# Patient Record
Sex: MALE | Race: WHITE | Employment: OTHER | URBAN - METROPOLITAN AREA
[De-identification: names, ages, dates, MRNs, and addresses within clinical notes are randomized per-mention and may not be internally consistent; named-entity substitution may affect disease eponyms.]

---

## 2017-01-05 ENCOUNTER — HOSPITAL ENCOUNTER (OUTPATIENT)
Dept: GENERAL RADIOLOGY | Age: 62
Discharge: HOME | End: 2017-01-05
Attending: INTERNAL MEDICINE | Admitting: INTERNAL MEDICINE

## 2017-01-05 LAB
ANION GAP SERPL CALCULATED.3IONS-SCNC: 11 MMOL/L (ref 4–16)
CHLORIDE BLD-SCNC: 91 MMOL/L (ref 99–110)
CO2: 30 MMOL/L (ref 21–32)
POTASSIUM SERPL-SCNC: 3.1 MMOL/L (ref 3.5–5.1)
SODIUM BLD-SCNC: 132 MMOL/L (ref 135–145)

## 2017-03-20 ENCOUNTER — TELEPHONE (OUTPATIENT)
Dept: INTERNAL MEDICINE CLINIC | Age: 62
End: 2017-03-20

## 2017-03-21 ENCOUNTER — OFFICE VISIT (OUTPATIENT)
Dept: INTERNAL MEDICINE CLINIC | Age: 62
End: 2017-03-21

## 2017-03-21 VITALS
SYSTOLIC BLOOD PRESSURE: 102 MMHG | HEART RATE: 100 BPM | BODY MASS INDEX: 26.03 KG/M2 | HEIGHT: 72 IN | DIASTOLIC BLOOD PRESSURE: 70 MMHG | WEIGHT: 192.2 LBS

## 2017-03-21 DIAGNOSIS — Z76.89 ENCOUNTER TO ESTABLISH CARE WITH NEW DOCTOR: ICD-10-CM

## 2017-03-21 DIAGNOSIS — K70.30 ALCOHOLIC CIRRHOSIS OF LIVER WITHOUT ASCITES (HCC): Primary | ICD-10-CM

## 2017-03-21 DIAGNOSIS — Z00.00 ANNUAL PHYSICAL EXAM: ICD-10-CM

## 2017-03-21 DIAGNOSIS — G25.81 RESTLESS LEG SYNDROME: ICD-10-CM

## 2017-03-21 DIAGNOSIS — F17.200 TOBACCO DEPENDENCE: ICD-10-CM

## 2017-03-21 DIAGNOSIS — Z11.4 SCREENING FOR HIV (HUMAN IMMUNODEFICIENCY VIRUS): ICD-10-CM

## 2017-03-21 DIAGNOSIS — F10.10 ETOH ABUSE: ICD-10-CM

## 2017-03-21 PROCEDURE — 99204 OFFICE O/P NEW MOD 45 MIN: CPT | Performed by: INTERNAL MEDICINE

## 2017-03-21 RX ORDER — GABAPENTIN 100 MG/1
100 CAPSULE ORAL NIGHTLY
Qty: 90 CAPSULE | Refills: 0 | Status: SHIPPED | OUTPATIENT
Start: 2017-03-21 | End: 2017-05-10

## 2017-03-21 ASSESSMENT — ENCOUNTER SYMPTOMS
SORE THROAT: 0
SHORTNESS OF BREATH: 0
NAUSEA: 0
EYE PAIN: 0
WHEEZING: 0
CONSTIPATION: 0
DIARRHEA: 0
BACK PAIN: 0
ABDOMINAL PAIN: 0
VOMITING: 0
COUGH: 0

## 2017-05-03 ENCOUNTER — HOSPITAL ENCOUNTER (OUTPATIENT)
Dept: GENERAL RADIOLOGY | Age: 62
Discharge: HOME | End: 2017-05-03
Attending: INTERNAL MEDICINE | Admitting: INTERNAL MEDICINE

## 2017-05-03 LAB
ANION GAP SERPL CALCULATED.3IONS-SCNC: 16 MMOL/L (ref 4–16)
CHLORIDE BLD-SCNC: 90 MMOL/L (ref 99–110)
CO2: 28 MMOL/L (ref 21–32)
HCT VFR BLD CALC: 46.8 % (ref 42–52)
HEMOGLOBIN: 16.1 GM/DL (ref 13.5–18)
MCH RBC QN AUTO: 34.8 PG (ref 27–31)
MCHC RBC AUTO-ENTMCNC: 34.4 % (ref 32–36)
MCV RBC AUTO: 101.3 FL (ref 78–100)
PDW BLD-RTO: 13.5 % (ref 11.7–14.9)
PLATELET # BLD: 148 K/CU MM (ref 140–440)
PMV BLD AUTO: 11.2 FL (ref 7.5–11.1)
POTASSIUM SERPL-SCNC: 3 MMOL/L (ref 3.5–5.1)
RBC # BLD: 4.62 M/CU MM (ref 4.6–6.2)
SODIUM BLD-SCNC: 134 MMOL/L (ref 135–145)
WBC # BLD: 5.2 K/CU MM (ref 4–10.5)

## 2017-05-09 ENCOUNTER — TELEPHONE (OUTPATIENT)
Dept: INTERNAL MEDICINE CLINIC | Age: 62
End: 2017-05-09

## 2017-05-09 ENCOUNTER — HOSPITAL ENCOUNTER (OUTPATIENT)
Dept: GENERAL RADIOLOGY | Age: 62
Discharge: HOME | End: 2017-05-09
Attending: INTERNAL MEDICINE | Admitting: INTERNAL MEDICINE

## 2017-05-09 LAB
ALBUMIN SERPL-MCNC: 3.6 GM/DL (ref 3.4–5)
ALP BLD-CCNC: 141 IU/L (ref 40–129)
ALT SERPL-CCNC: 20 U/L (ref 10–40)
ANION GAP SERPL CALCULATED.3IONS-SCNC: 11 MMOL/L (ref 4–16)
AST SERPL-CCNC: 46 IU/L (ref 15–37)
BASOPHILS ABSOLUTE: 0 K/CU MM
BASOPHILS RELATIVE PERCENT: 0.7 % (ref 0–1)
BILIRUB SERPL-MCNC: 0.9 MG/DL (ref 0–1)
BUN BLDV-MCNC: 6 MG/DL (ref 6–23)
CALCIUM SERPL-MCNC: 8.8 MG/DL (ref 8.3–10.6)
CHLORIDE BLD-SCNC: 102 MMOL/L (ref 99–110)
CO2: 27 MMOL/L (ref 21–32)
CREAT SERPL-MCNC: 0.8 MG/DL (ref 0.9–1.3)
DIFFERENTIAL TYPE: ABNORMAL
EOSINOPHILS ABSOLUTE: 0.3 K/CU MM
EOSINOPHILS RELATIVE PERCENT: 6.1 % (ref 0–3)
FOLATE: 9.7 NG/ML (ref 3.1–17.5)
GFR AFRICAN AMERICAN: >60 ML/MIN/1.73M2
GFR NON-AFRICAN AMERICAN: >60 ML/MIN/1.73M2
GLUCOSE BLD-MCNC: 106 MG/DL (ref 70–140)
HCT VFR BLD CALC: 42.7 % (ref 42–52)
HEMOGLOBIN: 14.9 GM/DL (ref 13.5–18)
HIV SCREEN: NON REACTIVE
IMMATURE NEUTROPHIL %: 0.2 % (ref 0–0.43)
LYMPHOCYTES ABSOLUTE: 2 K/CU MM
LYMPHOCYTES RELATIVE PERCENT: 45.5 % (ref 24–44)
MCH RBC QN AUTO: 36.1 PG (ref 27–31)
MCHC RBC AUTO-ENTMCNC: 34.9 % (ref 32–36)
MCV RBC AUTO: 103.4 FL (ref 78–100)
MONOCYTES ABSOLUTE: 0.6 K/CU MM
MONOCYTES RELATIVE PERCENT: 13.3 % (ref 0–4)
NUCLEATED RBC %: 0 %
PDW BLD-RTO: 13.7 % (ref 11.7–14.9)
PLATELET # BLD: 143 K/CU MM (ref 140–440)
PMV BLD AUTO: 10.9 FL (ref 7.5–11.1)
POTASSIUM SERPL-SCNC: 4.1 MMOL/L (ref 3.5–5.1)
RBC # BLD: 4.13 M/CU MM (ref 4.6–6.2)
SEGMENTED NEUTROPHILS ABSOLUTE COUNT: 1.5 K/CU MM
SEGMENTED NEUTROPHILS RELATIVE PERCENT: 34.2 % (ref 36–66)
SODIUM BLD-SCNC: 140 MMOL/L (ref 135–145)
TOTAL IMMATURE NEUTOROPHIL: 0.01 K/CU MM
TOTAL NUCLEATED RBC: 0 K/CU MM
TOTAL PROTEIN: 7.1 GM/DL (ref 6.4–8.2)
VITAMIN B-12: 664 PG/ML (ref 211–911)
WBC # BLD: 4.4 K/CU MM (ref 4–10.5)

## 2017-05-10 ENCOUNTER — OFFICE VISIT (OUTPATIENT)
Dept: INTERNAL MEDICINE CLINIC | Age: 62
End: 2017-05-10

## 2017-05-10 VITALS
HEART RATE: 97 BPM | DIASTOLIC BLOOD PRESSURE: 78 MMHG | WEIGHT: 194.4 LBS | SYSTOLIC BLOOD PRESSURE: 100 MMHG | BODY MASS INDEX: 26.37 KG/M2

## 2017-05-10 DIAGNOSIS — F17.200 TOBACCO DEPENDENCE: ICD-10-CM

## 2017-05-10 DIAGNOSIS — K70.30 ALCOHOLIC CIRRHOSIS OF LIVER WITHOUT ASCITES (HCC): Primary | ICD-10-CM

## 2017-05-10 DIAGNOSIS — Z12.11 COLON CANCER SCREENING: ICD-10-CM

## 2017-05-10 DIAGNOSIS — F10.10 ETOH ABUSE: ICD-10-CM

## 2017-05-10 PROCEDURE — 99213 OFFICE O/P EST LOW 20 MIN: CPT | Performed by: INTERNAL MEDICINE

## 2017-05-10 ASSESSMENT — ENCOUNTER SYMPTOMS
DIARRHEA: 0
BACK PAIN: 0
CONSTIPATION: 0
EYE PAIN: 0
WHEEZING: 0
ABDOMINAL PAIN: 0
SORE THROAT: 0
COUGH: 0
SHORTNESS OF BREATH: 0

## 2017-05-12 DIAGNOSIS — E51.9 THIAMINE DEFICIENCY: Primary | ICD-10-CM

## 2017-05-12 LAB — VITAMIN B1, PLASMA: <2

## 2017-05-12 RX ORDER — LANOLIN ALCOHOL/MO/W.PET/CERES
100 CREAM (GRAM) TOPICAL DAILY
Qty: 30 TABLET | Refills: 3 | Status: SHIPPED | OUTPATIENT
Start: 2017-05-12 | End: 2019-02-23

## 2017-05-12 RX ORDER — MULTIVIT-MIN/IRON FUM/FOLIC AC 7.5 MG-4
1 TABLET ORAL DAILY
Qty: 30 TABLET | Refills: 3 | Status: SHIPPED | OUTPATIENT
Start: 2017-05-12 | End: 2017-11-16

## 2017-06-08 ENCOUNTER — OFFICE VISIT (OUTPATIENT)
Dept: INTERNAL MEDICINE CLINIC | Age: 62
End: 2017-06-08

## 2017-06-08 VITALS
SYSTOLIC BLOOD PRESSURE: 102 MMHG | WEIGHT: 196 LBS | DIASTOLIC BLOOD PRESSURE: 66 MMHG | RESPIRATION RATE: 16 BRPM | HEART RATE: 91 BPM | BODY MASS INDEX: 26.58 KG/M2

## 2017-06-08 DIAGNOSIS — F10.10 ETOH ABUSE: ICD-10-CM

## 2017-06-08 DIAGNOSIS — K70.30 ALCOHOLIC CIRRHOSIS OF LIVER WITHOUT ASCITES (HCC): ICD-10-CM

## 2017-06-08 DIAGNOSIS — M25.511 ACUTE PAIN OF RIGHT SHOULDER: Primary | ICD-10-CM

## 2017-06-08 DIAGNOSIS — F17.200 TOBACCO DEPENDENCE: ICD-10-CM

## 2017-06-08 PROCEDURE — 99213 OFFICE O/P EST LOW 20 MIN: CPT | Performed by: INTERNAL MEDICINE

## 2017-06-08 ASSESSMENT — ENCOUNTER SYMPTOMS
COUGH: 0
SHORTNESS OF BREATH: 0
BACK PAIN: 0
EYE PAIN: 0
DIARRHEA: 0
CONSTIPATION: 0
WHEEZING: 0
ABDOMINAL PAIN: 0
SORE THROAT: 0

## 2017-08-08 ENCOUNTER — HOSPITAL ENCOUNTER (OUTPATIENT)
Dept: GENERAL RADIOLOGY | Age: 62
Discharge: HOME | End: 2017-08-08
Attending: INTERNAL MEDICINE | Admitting: INTERNAL MEDICINE

## 2017-08-08 LAB
ALBUMIN SERPL-MCNC: 3.6 GM/DL (ref 3.4–5)
ALP BLD-CCNC: 136 IU/L (ref 40–129)
ALT SERPL-CCNC: 24 U/L (ref 10–40)
ANION GAP SERPL CALCULATED.3IONS-SCNC: 13 MMOL/L (ref 4–16)
AST SERPL-CCNC: 55 IU/L (ref 15–37)
BILIRUB SERPL-MCNC: 1.5 MG/DL (ref 0–1)
BILIRUBIN DIRECT: 0.5 MG/DL (ref 0–0.3)
BILIRUBIN, INDIRECT: 1 MG/DL (ref 0–0.7)
CHLORIDE BLD-SCNC: 101 MMOL/L (ref 99–110)
CO2: 25 MMOL/L (ref 21–32)
POTASSIUM SERPL-SCNC: 4.1 MMOL/L (ref 3.5–5.1)
SODIUM BLD-SCNC: 139 MMOL/L (ref 135–145)
TOTAL PROTEIN: 7.4 GM/DL (ref 6.4–8.2)

## 2017-09-12 ENCOUNTER — TELEPHONE (OUTPATIENT)
Dept: INTERNAL MEDICINE CLINIC | Age: 62
End: 2017-09-12

## 2017-09-12 DIAGNOSIS — G89.29 CHRONIC RIGHT SHOULDER PAIN: Primary | ICD-10-CM

## 2017-09-12 DIAGNOSIS — M25.511 CHRONIC RIGHT SHOULDER PAIN: Primary | ICD-10-CM

## 2017-09-18 ENCOUNTER — OFFICE VISIT (OUTPATIENT)
Dept: ORTHOPEDIC SURGERY | Age: 62
End: 2017-09-18

## 2017-09-18 VITALS — WEIGHT: 196 LBS | HEIGHT: 72 IN | BODY MASS INDEX: 26.55 KG/M2 | RESPIRATION RATE: 16 BRPM

## 2017-09-18 DIAGNOSIS — M75.21 BICEPS TENDINITIS ON RIGHT: Primary | ICD-10-CM

## 2017-09-18 DIAGNOSIS — R52 PAIN: ICD-10-CM

## 2017-09-18 PROCEDURE — 73010 X-RAY EXAM OF SHOULDER BLADE: CPT | Performed by: ORTHOPAEDIC SURGERY

## 2017-09-18 PROCEDURE — 73030 X-RAY EXAM OF SHOULDER: CPT | Performed by: ORTHOPAEDIC SURGERY

## 2017-09-18 PROCEDURE — 20610 DRAIN/INJ JOINT/BURSA W/O US: CPT | Performed by: ORTHOPAEDIC SURGERY

## 2017-09-18 PROCEDURE — 99244 OFF/OP CNSLTJ NEW/EST MOD 40: CPT | Performed by: ORTHOPAEDIC SURGERY

## 2017-09-18 ASSESSMENT — ENCOUNTER SYMPTOMS
EYES NEGATIVE: 1
GASTROINTESTINAL NEGATIVE: 1
RESPIRATORY NEGATIVE: 1

## 2017-09-25 ENCOUNTER — HOSPITAL ENCOUNTER (OUTPATIENT)
Dept: PHYSICAL THERAPY | Age: 62
Discharge: HOME | End: 2017-09-30
Attending: ORTHOPAEDIC SURGERY | Admitting: ORTHOPAEDIC SURGERY

## 2017-09-25 ASSESSMENT — PAIN DESCRIPTION - PROGRESSION: CLINICAL_PROGRESSION: GRADUALLY IMPROVING

## 2017-09-25 ASSESSMENT — PAIN DESCRIPTION - ORIENTATION: ORIENTATION: RIGHT

## 2017-09-25 ASSESSMENT — PAIN DESCRIPTION - DESCRIPTORS: DESCRIPTORS: ACHING;SHARP

## 2017-09-25 ASSESSMENT — PAIN DESCRIPTION - LOCATION: LOCATION: SHOULDER;ARM

## 2017-09-25 ASSESSMENT — PAIN SCALES - GENERAL: PAINLEVEL_OUTOF10: 0

## 2017-09-25 ASSESSMENT — PAIN DESCRIPTION - PAIN TYPE: TYPE: ACUTE PAIN

## 2017-09-25 ASSESSMENT — PAIN DESCRIPTION - FREQUENCY: FREQUENCY: INTERMITTENT

## 2017-09-25 ASSESSMENT — PAIN DESCRIPTION - ONSET: ONSET: SUDDEN

## 2017-09-25 NOTE — FLOWSHEET NOTE
Exercise  [] Modalities:  [x] Therapeutic Activity     [] Ultrasound  [] Elec  Stim  [] Gait Training      [] Cervical Traction [] Lumbar Traction  [x] Neuromuscular Re-education    [] Cold/hotpack [] Iontophoresis   [x] Instruction in HEP      [] Vasopneumatic     [x] Manual Therapy               [] Aquatic Therapy     Manual Treatments:  None    Modalities:  None    Communication with other providers:  POC set for cosign 9/25/17    Education provided to patient/caregiver: Adverse reactions to treatment:      Equipment provided:      Assessment:  Pt is a 58 yo male who presents w/ R shoulder pain resolving after Cortizone injection. He demonstrates mild hypomoblity in R shoulder w/ postural deficits and some high riding of humeral head in [address] joint w/ crepitus noted. He will benefit from a couple sessions of PT to educate for a home management program to help continue to manage his pain. Prior to June of this year he had no pain in R shoudler. Patient agrees with established plan of care and assisted in the development of their short term and long term goals. Patient had no adverse reaction with initial treatment and there are no barriers to learning. Demonstrates no mental or cognitive disorder.      Time In / Time Out:  0925/1000                 Timed Code/Total Treatment Minutes:  15/35    Patients Report of Tolerance:    [] Patient limited by fatigue        [] Patient limited by pain   [] Patient limited by other medical complications   [x] Other: tolerated w/o pain    Prognosis:   [x] Good [] Fair  [] Poor    Plan:   [] Continue per plan of care [] Alter current plan (see comments)  [x] Plan of care initiated [] Hold pending MD visit [] Discharge    Plan for Next Session:  Review HEP and recheck, D/C as approp, discuss postural input        Next Progress Note due:    At d/c        Electronically signed by:  Marley Baltazar, PT, MPT, ATC  9/25/2017, 10:13 AM    9/25/2017, 10:13 AM

## 2017-09-25 NOTE — PROGRESS NOTES
Physical Therapy  Initial Assessment  Date: 2017  Patient Name: Aman Londono  MRN: [id number]  : 1955     Treatment Diagnosis: R shoulder pain, postural deficits    Restrictions  Position Activity Restriction  Other position/activity restrictions: none    Subjective   General  Chart Reviewed: Yes  Patient assessed for rehabilitation services?: Yes  Additional Pertinent Hx: pain since , working under a truck in awkward position and not sure what did but started hurting after this, did Get Cortizone and that seems to be helping, no prior Hx of issue and good on L side  Referring Practitioner: Horacio Mckay  Diagnosis: R bicep tendonitis  General Comment  Comments: R handed  Subjective  Subjective: pain currently not too bad, but pain almost gone and sleep much better only occasional twinges now  Pain Screening  Patient Currently in Pain: Yes  Pain Assessment  Pain Assessment: 0-10  Pain Level: 0 (max pain since cortizone 1-2/10, before Cortizone 7-/8/10 mary at night)  Pain Type: Acute pain  Pain Location: Shoulder;Arm  Pain Orientation: Right  Pain Radiating Towards: to elbow ant and post  Pain Descriptors: Aching; Sharp  Pain Frequency: Intermittent  Pain Onset: Sudden  Clinical Progression: Gradually improving  Effect of Pain on Daily Activities: limits work just a little now, was more limiting and sleep too  Patient's Stated Pain Goal: No pain  Vital Signs  Patient Currently in Pain: Yes    Vision/Hearing  Vision  Vision: Impaired    Orientation  Orientation  Overall Orientation Status: Within Normal Limits    Social/Functional History  Social/Functional History  Lives With: Spouse  Type of Home: House  Occupation: Full time employment  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     Leisure & Hobbies: housework and yard work   Objective     Observation/Palpation  Palpation: TTP ant and post shoulder just mild today    AROM RUE (degrees)  RUE General AROM: flex and abd WFL min end range catch/pain, ER and IR scratch also more noted min difference w/ some catch and end range pain    Joint Mobility  ROM RUE: mild hypomobility R shoulder w/ rounded posture and protracted scapula    Strength RUE  Strength RUE: WNL  Comment: no pain, including prone scap  Strength LUE  Strength LUE: WNL     Additional Measures  Special Tests: Negative impinigement and bicep tests, mild decreased mobility w/ tests and some crepitus  Other: No issue neck or elbow  Sensation  Overall Sensation Status: WNL    Assessment   Conditions Requiring Skilled Therapeutic Intervention  Body structures, Functions, Activity limitations: Decreased functional mobility ; Decreased ADL status; Decreased ROM  Assessment: Pt is a 58 yo male who presents w/ R shoulder pain resolving after Cortizone injection. He demonstrates mild hypomoblity in R shoulder w/ postural deficits and some high riding of humeral head in [facility] joint w/ crepitus noted. He will benefit from a couple sessions of PT to educate for a home management program to help continue to manage his pain. Prior to June of this year he had no pain in R shoudler. Patient agrees with established plan of care and assisted in the development of their short term and long term goals. Patient had no adverse reaction with initial treatment and there are no barriers to learning. Demonstrates no mental or cognitive disorder.    Treatment Diagnosis: R shoulder pain, postural deficits  Prognosis: Excellent  Decision Making: Low Complexity  Patient Education: see flow sheet  Barriers to Learning: none  REQUIRES PT FOLLOW UP: Yes         Plan   Plan  Times per week: 1x every other week  Plan weeks: 2 weeks  Specific instructions for Next Treatment: check HEP and d/c as approp, review postural input  Current Treatment Recommendations: Strengthening, ROM, Home Exercise Program, Functional Mobility Training    G-Code  PT G-Codes  Functional Assessment Tool Used: quick dash  Score: 21  Functional Limitation: Carrying, moving and handling objects  Carrying, Moving and Handling Objects Current Status (): At least 20 percent but less than 40 percent impaired, limited or restricted  Carrying, Moving and Handling Objects Goal Status ():  At least 1 percent but less than 20 percent impaired, limited or restricted    OutComes Score  Quick Dash 21  Goals  Short term goals  Time Frame for Short term goals: defer to LTG's  Long term goals  Time Frame for Long term goals : 2 weeks, 10/9/17  Long term goal 1: Pt will be independent w/ HEP and able to manage his pain on his own  Long term goal 2: Pt will be able to work and perform all usual activity w/o pain  Patient Goals   Patient goals : get back to normal and work w/o pain         Shanta Woodall, PT  PT, MPT, ATC     9/25/2017, 10:11 AM

## 2017-09-25 NOTE — PLAN OF CARE
Outpatient Physical Therapy           [place]           [x] Phone: [phone]   Fax: [phone]  [place]           [] Phone: [phone]   Fax: [phone]     To: Referring Practitioner: Jabari Gutierrez    From: Edith Oliveira, KRISTAN     Patient: Glenora Goodell       : 1955  Diagnosis: Diagnosis: R bicep tendonitis   Treatment Diagnosis: Treatment Diagnosis: R shoulder pain, postural deficits   Date: 2017    Physical Therapy Certification/Re-Certification Form  Dear Dr. Jabari Gutierrez,   The following patient has been evaluated for physical therapy services and for therapy to continue, insurance requires physician review of the treatment plan initially and every 90 days. Please review the attached evaluation and/or summary of the patient's plan of care, and verify that you agree therapy should continue by signing the attached document and sending it back to our office. Assessment:  Pt is a 58 yo male who presents w/ R shoulder pain resolving after Cortizone injection. He demonstrates mild hypomoblity in R shoulder w/ postural deficits and some high riding of humeral head in [address] joint w/ crepitus noted. He will benefit from a couple sessions of PT to educate for a home management program to help continue to manage his pain. Prior to  of this year he had no pain in R shoudler. Patient agrees with established plan of care and assisted in the development of their short term and long term goals. Patient had no adverse reaction with initial treatment and there are no barriers to learning. Demonstrates no mental or cognitive disorder.      Plan of Care/Treatment to date:  [x] Therapeutic Exercise  [x] Modalities:  [x] Therapeutic Activity     [] Ultrasound  [] Electrical Stimulation  [] Gait Training      [] Cervical Traction [] Lumbar Traction  [x] Neuromuscular Re-education    [x] Cold/hotpack [] Iontophoresis   [x] Instruction in HEP      [] Vasopneumatic     [x] Manual Therapy               [] Aquatic Therapy       Other:    ? Frequency/Duration:  # Days per week: [x] 1 day # Weeks: [] 1 week [] 5 weeks     [] 2 days?    [x] 2 weeks [] 6 weeks     [] 3 days   [] 3 weeks [] 7 weeks     [] 4 days   [] 4 weeks [] 8 weeks         [] 9 weeks [] 10 weeks         [] 11 weeks [] 12 weeks    Rehab Potential/Progress: [x] Excellent [] Good [] Fair  [] Poor     Goals:      Short term goals  Time Frame for Short term goals: defer to LTG's  Long term goals  Time Frame for Long term goals : 2 weeks, 10/9/17  Long term goal 1: Pt will be independent w/ HEP and able to manage his pain on his own  Long term goal 2: Pt will be able to work and perform all usual activity w/o pain    G-Code Selection: (On Eval and every 10th visit or Discharge)  MEASURE  [] Mobility: Walking and Moving Around     [] Current ()   [] Goal ()   [] DC ()  [] Changing/Maintaining Body Position     [] Current (8981)      [] Goal ()   [] DC ()  [x] Carrying / Moving / Handling Objects     [x] Current ()   [x] Goal ()   [] DC ()  [] Self-Care     [] Current ()   [] Goal ()   [] DC ()  [] Other PT/OT primary DX     [] Current ()   [] Goal ()   [] DC ()    SEVERITY  CURRENT  GOAL  DISCHARGE   [] CH (0% Impaired, Indep.)  [] CI (1-19% Impaired, SBA-CGA)  [x] CJ (20-39% Impaired, MIN A)  [] CK  (40-59% Impairment, Mod A)  [] CL  (60-79% Impairment, Max A)  [] CM  (80-99% Impairment, Dep.)   [] CN  (100% Impairment, Tot Dep.) [] CH (0% Impaired, Indep.)  [x] CI (1-19% Impaired, SBA-CGA)  [] CJ (20-39% Impaired, MIN A)  [] CK  (40-59% Impairment, Mod A)  [] CL  (60-79% Impairment, Max A)  [] CM  (80-99% Impairment, Dep.)   [] CN  (100% Impairment, Tot Dep.)  [] CH (0% Impaired, Indep.)  [] CI (1-19% Impaired, SBA-CGA)  [] CJ (20-39% Impaired, MIN A)  [] CK  (40-59% Impairment, Mod A)  [] CL  (60-79% Impairment, Max A)  [] CM  (80-99% Impairment, Dep.)   [] CN  (100% Impairment, Tot Dep.) Electronically signed by:  Jerardo Reyes PT, MPT, ATC  9/25/2017, 10:11 AM    9/25/2017, 10:12 AM  If you have any questions or concerns, please don't hesitate to call.   Thank you for your referral.      Physician Signature:________________________________Date:_________ TIME: _____  By signing above, therapists plan is approved by physician

## 2017-10-01 ENCOUNTER — HOSPITAL ENCOUNTER (OUTPATIENT)
Dept: OTHER | Age: 62
Discharge: HOME | End: 2017-10-10
Attending: ORTHOPAEDIC SURGERY | Admitting: ORTHOPAEDIC SURGERY

## 2017-10-10 ENCOUNTER — HOSPITAL ENCOUNTER (OUTPATIENT)
Dept: PHYSICAL THERAPY | Age: 62
Discharge: HOME | End: 2017-10-10
Admitting: ORTHOPAEDIC SURGERY

## 2017-10-10 NOTE — PROGRESS NOTES
Outpatient Physical Therapy           [place]           [x] Phone: [phone]   Fax: [phone]  [place]           [] Phone: [phone]   Fax: [phone]      To: Praful Richardson        From: Henry Beck, KRISTAN     Patient: Edgar Pérez                  : 1955  Diagnosis: R bicep tendonitis    Date: 10/10/2017  Treatment Diagnosis: R shoulder pain, postural deficits      []  Progress Note                [x]  Discharge Note    Evaluation Date:  17 Total Visits to date:  2 Cancels/No-shows to date:  0    Subjective:  Some soreness w/ work is all, no real pain. HEP was fine. Working on building strength back up. Plan of Care/Treatment to date:  [x] Therapeutic Exercise    [] Modalities:  [x] Therapeutic Activity     [] Ultrasound  [] Electrical Stimulation  [] Gait Training      [] Cervical Traction   [] Lumbar Traction  [x] Neuromuscular Re-education  [] Cold/hotpack [] Iontophoresis  [x] Instruction in HEP      Other:  [x] Manual Therapy       []  Vasopneumatic  [] Aquatic Therapy       []                    ? Objective/Significant Findings At Last Visit/Comments:  Still moderate crepitus w/ AROM all directions in R shoulder w/ mild discomfort but mostly equal motion to L UE. Assessment:   Pt tolerated Rx well and is doing well w/ his HEP. He should do ok on his own w/ the HEP at this point, but knows to follow up w/ doctor prn. No pain after Rx and some decreased crepitus following Rx.        Goal Status:  [x] Achieved [] Partially Achieved  [] Not Achieved   Short term goals  Time Frame for Short term goals: defer to LTG's  Long term goals  Time Frame for Long term goals : 2 weeks, 10/9/17  Long term goal 1: Pt will be independent w/ HEP and able to manage his pain on his own  Met  Long term goal 2: Pt will be able to work and perform all usual activity w/o pain  Met     G-Code Selection: (On Eval and every 10th visit or Discharge)  MEASURE  [] Mobility: Walking and Moving Around     [] Current ()   [] Goal ()   [] DC ()  [] Changing/Maintaining Body Position     [] Current (7686)      [] Goal ()   [] DC ()  [x] Carrying / Moving / Handling Objects     [] Current ()   [x] Goal ()   [x] DC ()  [] Self-Care     [] Current ()   [] Goal ()   [] DC ()  [] Other PT/OT primary DX     [] Current ()   [] Goal ()   [] DC ()    SEVERITY  CURRENT  GOAL  DISCHARGE   [] CH (0% Impaired, Indep.)  [] CI (1-19% Impaired, SBA-CGA)  [] CJ (20-39% Impaired, MIN A)  [] CK  (40-59% Impairment, Mod A)  [] CL  (60-79% Impairment, Max A)  [] CM  (80-99% Impairment, Dep.)   [] CN  (100% Impairment, Tot Dep.) [] CH (0% Impaired, Indep.)  [x] CI (1-19% Impaired, SBA-CGA)  [] CJ (20-39% Impaired, MIN A)  [] CK  (40-59% Impairment, Mod A)  [] CL  (60-79% Impairment, Max A)  [] CM  (80-99% Impairment, Dep.)   [] CN  (100% Impairment, Tot Dep.)  [] CH (0% Impaired, Indep.)  [x] CI (1-19% Impaired, SBA-CGA)  [] CJ (20-39% Impaired, MIN A)  [] CK  (40-59% Impairment, Mod A)  [] CL  (60-79% Impairment, Max A)  [] CM  (80-99% Impairment, Dep.)   [] CN  (100% Impairment, Tot Dep.)         Frequency/Duration:  # Days per week: [x] 1 day # Weeks: [] 1 week [] 4 weeks [] 8 weeks     [] 2 days? [x] 2 weeks [] 5 weeks [] 10 weeks     [] 3 days   [] 3 weeks [] 6 weeks [] 12 weeks       Rehab Potential: [x] Excellent [] Good [] Fair  [] Poor         Patient Status: [] Continue per initial plan of Care     [x] Patient now discharged     [] Additional visits requested, Please re-certify for additional visits:          Electronically signed by:  STACY Potter, ATC , 10/10/2017, 6:56 AM    10/10/2017, 8:58 AM   If you have any questions or concerns, please don't hesitate to call.   Thank you for your referral.

## 2017-10-10 NOTE — FLOWSHEET NOTE
Outpatient Physical Therapy           [place]           [x] Phone: [phone]   Fax: [phone]  [place]           [] Phone: [phone]   Fax: [phone]    Physical Therapy Daily Treatment Note  Date:  10/10/2017    Patient Name:  Rene Denson    :  1955  MRN: [id number]  Restrictions/Precautions:   Diagnosis:   Diagnosis: R bicep tendonitis  Treatment Diagnosis: Treatment Diagnosis: R shoulder pain, postural deficits    Insurance/Certification information:  Maria D [employer]  Referring Physician:  Referring Practitioner: Dania Medrano  Next Doctor Visit:    Plan of care signed (Y/N):  yes  Visit# / total visits:   2/2 POC  Pain level: 0/10   Goals:       Short term goals  Time Frame for Short term goals: defer to LTG's  Long term goals  Time Frame for Long term goals : 2 weeks, 10/9/17  Long term goal 1: Pt will be independent w/ HEP and able to manage his pain on his own  Met  Long term goal 2: Pt will be able to work and perform all usual activity w/o pain  Met          Subjective:   Some soreness w/ work is all, no real pain. HEP was fine. Working on building strength back up. Any changes in Ambulatory Summary Sheet? No        Objective:   Still moderate crepitus w/ AROM all directions in R shoulder w/ mild discomfort but mostly equal motion to L UE.           Exercises:  Exercise/Equipment 9/25/17 10/10/17 Date           pec stretch   instruct 30\"x2    tricep stretch   20\" 30\"x2    Posterior shoulder stretch   20\" 30\"x2            Taffy pulls   BTB 10x BTB 20x    Lawnmower pulls BTB 10x BTB 20x    Flex TB   BTB-Lots of crepitus, held --    Counter push up   10x2 20x                                                                                         Other Therapeutic Activities/Education:  Reviewed posture and role in function of scap    Home Exercise Program:  Continue for a few more weeks as able, added eccentric shoulder flex    Modality/intervention used:    [x] Therapeutic Exercise  [] Modalities:  [x] Therapeutic Activity     [] Ultrasound  [] Elec  Stim  [] Gait Training      [] Cervical Traction [] Lumbar Traction  [x] Neuromuscular Re-education    [] Cold/hotpack [] Iontophoresis   [x] Instruction in HEP      [] Vasopneumatic     [x] Manual Therapy               [] Aquatic Therapy     Manual Treatments:  scap mobs and [address] mobs to try and reduce crepitus. some AC and clavicle too, 10'    Modalities:  None    Communication with other providers:  POC set for cosign 9/25/17    Education provided to patient/caregiver: Adverse reactions to treatment:      Equipment provided:      Assessment:  Pt tolerated Rx well and is doing well w/ his HEP. He should do ok on his own w/ the HEP at this point, but knows to follow up w/ doctor prn. No pain after Rx and some decreased crepitus following Rx.      Time In / Time Out:    0820/0855               Timed Code/Total Treatment Minutes:  35/35     Patients Report of Tolerance:    [] Patient limited by fatigue        [] Patient limited by pain   [] Patient limited by other medical complications   [x] Other: tolerated w/o pain    Prognosis:   [x] Good [] Fair  [] Poor    Plan:   [] Continue per plan of care [] Alter current plan (see comments)  [] Plan of care initiated [] Hold pending MD visit [x] Discharge    Plan for Next Session:    D/C      Next Progress Note due:    See note 10/10/17       Electronically signed by:  Santa Christie PT, MPT, ATC  10/10/2017, 6:54 AM     10/10/2017, 8:55 AM

## 2017-11-16 ENCOUNTER — OFFICE VISIT (OUTPATIENT)
Dept: INTERNAL MEDICINE CLINIC | Age: 62
End: 2017-11-16

## 2017-11-16 VITALS
BODY MASS INDEX: 25.63 KG/M2 | HEART RATE: 88 BPM | DIASTOLIC BLOOD PRESSURE: 76 MMHG | RESPIRATION RATE: 16 BRPM | SYSTOLIC BLOOD PRESSURE: 120 MMHG | OXYGEN SATURATION: 98 % | WEIGHT: 189 LBS

## 2017-11-16 DIAGNOSIS — K70.30 ALCOHOLIC CIRRHOSIS OF LIVER WITHOUT ASCITES (HCC): ICD-10-CM

## 2017-11-16 DIAGNOSIS — M75.21 BICEPS TENDINITIS ON RIGHT: ICD-10-CM

## 2017-11-16 DIAGNOSIS — F10.10 ETOH ABUSE: ICD-10-CM

## 2017-11-16 DIAGNOSIS — F17.200 TOBACCO DEPENDENCE: Primary | ICD-10-CM

## 2017-11-16 PROCEDURE — 3017F COLORECTAL CA SCREEN DOC REV: CPT | Performed by: INTERNAL MEDICINE

## 2017-11-16 PROCEDURE — G8484 FLU IMMUNIZE NO ADMIN: HCPCS | Performed by: INTERNAL MEDICINE

## 2017-11-16 PROCEDURE — G8417 CALC BMI ABV UP PARAM F/U: HCPCS | Performed by: INTERNAL MEDICINE

## 2017-11-16 PROCEDURE — G8427 DOCREV CUR MEDS BY ELIG CLIN: HCPCS | Performed by: INTERNAL MEDICINE

## 2017-11-16 PROCEDURE — 99213 OFFICE O/P EST LOW 20 MIN: CPT | Performed by: INTERNAL MEDICINE

## 2017-11-16 PROCEDURE — 4004F PT TOBACCO SCREEN RCVD TLK: CPT | Performed by: INTERNAL MEDICINE

## 2017-11-16 ASSESSMENT — ENCOUNTER SYMPTOMS
WHEEZING: 0
SORE THROAT: 0
DIARRHEA: 0
EYE PAIN: 0
ABDOMINAL PAIN: 0
BACK PAIN: 0
SHORTNESS OF BREATH: 0
CONSTIPATION: 0
COUGH: 0

## 2017-11-16 NOTE — PROGRESS NOTES
times daily        No current facility-administered medications for this visit. Objective:  /76   Pulse 88   Resp 16   Wt 189 lb (85.7 kg)   SpO2 98%   BMI 25.63 kg/m²   BP Readings from Last 3 Encounters:   11/16/17 120/76   06/08/17 102/66   05/10/17 100/78     Wt Readings from Last 3 Encounters:   11/16/17 189 lb (85.7 kg)   09/18/17 196 lb (88.9 kg)   06/08/17 196 lb (88.9 kg)         Physical Exam   Constitutional: He is oriented to person, place, and time. He appears well-developed and well-nourished. No distress. HENT:   Head: Normocephalic and atraumatic. Eyes: Conjunctivae are normal. No scleral icterus. Neck: Normal range of motion. Neck supple. Cardiovascular: Normal rate and regular rhythm. Pulmonary/Chest: Effort normal and breath sounds normal. No respiratory distress. He has no wheezes. Abdominal: Soft. Bowel sounds are normal. He exhibits no distension. There is no tenderness. Neurological: He is alert and oriented to person, place, and time. Psychiatric: He has a normal mood and affect.  Judgment normal.       Lab Results   Component Value Date    WBC 4.4 05/09/2017    HGB 14.9 05/09/2017    HCT 42.7 05/09/2017    .4 (H) 05/09/2017     05/09/2017     Lab Results   Component Value Date     08/08/2017    K 4.1 08/08/2017     08/08/2017    CO2 25 08/08/2017    BUN 6 05/09/2017    CREATININE 0.8 (L) 05/09/2017    GLUCOSE 106 05/09/2017    CALCIUM 8.8 05/09/2017    PROT 7.4 08/08/2017    LABALBU 3.6 08/08/2017    BILITOT 1.5 (H) 08/08/2017    ALKPHOS 136 (H) 08/08/2017    AST 55 (H) 08/08/2017    ALT 24 08/08/2017    LABGLOM >60 05/09/2017    GFRAA >60 05/09/2017     Lab Results   Component Value Date    CHOL 133 01/19/2011     Lab Results   Component Value Date    TRIG 58 01/19/2011     Lab Results   Component Value Date    HDL 35 (L) 01/19/2011     Lab Results   Component Value Date    LDLCALC 86 01/19/2011     Lab Results   Component Value Date    LABA1C 5.5 05/20/2014         ASSESSMENT:      1. Tobacco dependence    2. Alcoholic cirrhosis of liver without ascites ([facility] [address]    3. ETOH abuse    4. Biceps tendinitis on right        PLAN:  1. Continue to encourage patient to quit smoking. Support offered patient is deferring for now. He is also deferring any nicotine replacement products. 2.  Continue to encourage patient to quit alcohol. He says he will keep on trying. 3.  Flu shot offered but patient is deferring. 4.  Pneumonia vaccination offered but patient is deferring. 5.  Says he will discuss colonoscopy with his gastroenterologist.  6.  Generally not interested in any preventive measure. 7.  Medications reviewed and reconciled. Care discussed with patient. Questions answered. Patient verbalizes understanding and agrees with plan. After visit summary provided. Advised to call for any problems, questions, or concerns. Return in about 3 months (around 2/16/2018). This note was partially completed with a verbal recognition program and it was checked for errors. It is possible that there are still dictated errors within this office note. Any errors should be brought immediately to my attention for correction. All efforts were made to ensure that this office note is accurate.        Signed:  Girma Sherman MD  11/16/17  8:43 AM

## 2017-12-22 ENCOUNTER — TELEPHONE (OUTPATIENT)
Dept: INTERNAL MEDICINE CLINIC | Age: 62
End: 2017-12-22

## 2017-12-22 DIAGNOSIS — M75.21 BICEPS TENDINITIS ON RIGHT: Primary | ICD-10-CM

## 2017-12-27 ENCOUNTER — HOSPITAL ENCOUNTER (OUTPATIENT)
Dept: WOMENS IMAGING | Age: 62
Discharge: HOME | End: 2017-12-27
Attending: PLASTIC SURGERY | Admitting: PLASTIC SURGERY

## 2017-12-27 DIAGNOSIS — N63.0 BREAST LUMP: ICD-10-CM

## 2017-12-27 DIAGNOSIS — N63.10 BREAST MASS, RIGHT: ICD-10-CM

## 2018-05-15 ENCOUNTER — HOSPITAL ENCOUNTER (OUTPATIENT)
Dept: GENERAL RADIOLOGY | Age: 63
Discharge: HOME | End: 2018-05-15
Attending: INTERNAL MEDICINE | Admitting: INTERNAL MEDICINE

## 2018-05-15 LAB
ANION GAP SERPL CALCULATED.3IONS-SCNC: 11 MMOL/L (ref 4–16)
BUN BLDV-MCNC: 9 MG/DL (ref 6–23)
CALCIUM SERPL-MCNC: 9 MG/DL (ref 8.3–10.6)
CHLORIDE BLD-SCNC: 103 MMOL/L (ref 99–110)
CO2: 27 MMOL/L (ref 21–32)
CREAT SERPL-MCNC: 0.9 MG/DL (ref 0.9–1.3)
GFR AFRICAN AMERICAN: >60 ML/MIN/1.73M2
GFR NON-AFRICAN AMERICAN: >60 ML/MIN/1.73M2
GLUCOSE BLD-MCNC: 107 MG/DL (ref 70–99)
POTASSIUM SERPL-SCNC: 3.7 MMOL/L (ref 3.5–5.1)
SODIUM BLD-SCNC: 141 MMOL/L (ref 135–145)

## 2018-10-18 ENCOUNTER — HOSPITAL ENCOUNTER (OUTPATIENT)
Age: 63
Discharge: HOME | End: 2018-10-18
Payer: MEDICARE

## 2018-10-18 LAB
ANION GAP SERPL CALCULATED.3IONS-SCNC: 11 MMOL/L (ref 4–16)
BUN BLDV-MCNC: 7 MG/DL (ref 6–23)
CALCIUM SERPL-MCNC: 8.6 MG/DL (ref 8.3–10.6)
CHLORIDE BLD-SCNC: 102 MMOL/L (ref 99–110)
CO2: 26 MMOL/L (ref 21–32)
CREAT SERPL-MCNC: 0.8 MG/DL (ref 0.9–1.3)
GFR AFRICAN AMERICAN: >60 ML/MIN/1.73M2
GFR NON-AFRICAN AMERICAN: >60 ML/MIN/1.73M2
GLUCOSE BLD-MCNC: 123 MG/DL (ref 70–99)
POTASSIUM SERPL-SCNC: 4.2 MMOL/L (ref 3.5–5.1)
SODIUM BLD-SCNC: 139 MMOL/L (ref 135–145)

## 2018-10-18 PROCEDURE — 80048 BASIC METABOLIC PNL TOTAL CA: CPT

## 2018-10-18 PROCEDURE — 36415 COLL VENOUS BLD VENIPUNCTURE: CPT

## 2019-02-23 ENCOUNTER — OFFICE VISIT (OUTPATIENT)
Dept: FAMILY MEDICINE CLINIC | Age: 64
End: 2019-02-23
Payer: MEDICARE

## 2019-02-23 VITALS
DIASTOLIC BLOOD PRESSURE: 80 MMHG | HEIGHT: 72 IN | WEIGHT: 195 LBS | SYSTOLIC BLOOD PRESSURE: 118 MMHG | OXYGEN SATURATION: 96 % | TEMPERATURE: 98.4 F | BODY MASS INDEX: 26.41 KG/M2 | HEART RATE: 93 BPM

## 2019-02-23 DIAGNOSIS — F17.200 TOBACCO DEPENDENCE: ICD-10-CM

## 2019-02-23 DIAGNOSIS — K70.30 ALCOHOLIC CIRRHOSIS, UNSPECIFIED WHETHER ASCITES PRESENT (HCC): Primary | ICD-10-CM

## 2019-02-23 PROCEDURE — 1101F PT FALLS ASSESS-DOCD LE1/YR: CPT | Performed by: NURSE PRACTITIONER

## 2019-02-23 PROCEDURE — 4004F PT TOBACCO SCREEN RCVD TLK: CPT | Performed by: NURSE PRACTITIONER

## 2019-02-23 PROCEDURE — G8419 CALC BMI OUT NRM PARAM NOF/U: HCPCS | Performed by: NURSE PRACTITIONER

## 2019-02-23 PROCEDURE — 4040F PNEUMOC VAC/ADMIN/RCVD: CPT | Performed by: NURSE PRACTITIONER

## 2019-02-23 PROCEDURE — G8484 FLU IMMUNIZE NO ADMIN: HCPCS | Performed by: NURSE PRACTITIONER

## 2019-02-23 PROCEDURE — 99213 OFFICE O/P EST LOW 20 MIN: CPT | Performed by: NURSE PRACTITIONER

## 2019-02-23 PROCEDURE — 1123F ACP DISCUSS/DSCN MKR DOCD: CPT | Performed by: NURSE PRACTITIONER

## 2019-02-23 PROCEDURE — 3017F COLORECTAL CA SCREEN DOC REV: CPT | Performed by: NURSE PRACTITIONER

## 2019-02-23 PROCEDURE — G8427 DOCREV CUR MEDS BY ELIG CLIN: HCPCS | Performed by: NURSE PRACTITIONER

## 2019-02-23 RX ORDER — POTASSIUM CHLORIDE 20 MEQ/1
40 TABLET, EXTENDED RELEASE ORAL 2 TIMES DAILY
Qty: 120 TABLET | Refills: 3 | Status: SHIPPED | OUTPATIENT
Start: 2019-02-23 | End: 2019-05-02 | Stop reason: SINTOL

## 2019-02-23 RX ORDER — POTASSIUM CHLORIDE 20 MEQ/1
40 TABLET, EXTENDED RELEASE ORAL 2 TIMES DAILY
Qty: 120 TABLET | Refills: 2 | Status: SHIPPED
Start: 2019-02-23 | End: 2019-02-23 | Stop reason: SDUPTHER

## 2019-02-23 RX ORDER — SPIRONOLACTONE 50 MG/1
50 TABLET, FILM COATED ORAL DAILY
Qty: 30 TABLET | Refills: 2 | Status: SHIPPED
Start: 2019-02-23 | End: 2019-02-23 | Stop reason: SDUPTHER

## 2019-02-23 RX ORDER — SPIRONOLACTONE 50 MG/1
50 TABLET, FILM COATED ORAL DAILY
Qty: 30 TABLET | Refills: 3 | Status: SHIPPED | OUTPATIENT
Start: 2019-02-23 | End: 2019-06-10 | Stop reason: SDUPTHER

## 2019-02-23 RX ORDER — FUROSEMIDE 40 MG/1
40 TABLET ORAL DAILY
Qty: 30 TABLET | Refills: 3 | Status: SHIPPED | OUTPATIENT
Start: 2019-02-23 | End: 2019-06-10 | Stop reason: SDUPTHER

## 2019-02-23 ASSESSMENT — ENCOUNTER SYMPTOMS
RESPIRATORY NEGATIVE: 1
GASTROINTESTINAL NEGATIVE: 1

## 2019-05-01 ENCOUNTER — TELEPHONE (OUTPATIENT)
Dept: GASTROENTEROLOGY | Age: 64
End: 2019-05-01

## 2019-05-01 NOTE — TELEPHONE ENCOUNTER
----- Message from Larry Munoz MD sent at 5/1/2019 11:25 AM EDT -----  Please get all his previous EGD and colonoscopy and path reports for me by tomorrow.     Thanks    [facility]

## 2019-05-02 ENCOUNTER — OFFICE VISIT (OUTPATIENT)
Dept: GASTROENTEROLOGY | Age: 64
End: 2019-05-02
Payer: MEDICARE

## 2019-05-02 VITALS
RESPIRATION RATE: 16 BRPM | SYSTOLIC BLOOD PRESSURE: 118 MMHG | HEART RATE: 89 BPM | WEIGHT: 188 LBS | BODY MASS INDEX: 25.5 KG/M2 | OXYGEN SATURATION: 90 % | DIASTOLIC BLOOD PRESSURE: 76 MMHG

## 2019-05-02 DIAGNOSIS — Z11.59 ENCOUNTER FOR SCREENING FOR OTHER VIRAL DISEASES: ICD-10-CM

## 2019-05-02 DIAGNOSIS — K70.30 ALCOHOLIC CIRRHOSIS OF LIVER WITHOUT ASCITES (HCC): Primary | ICD-10-CM

## 2019-05-02 PROCEDURE — 4040F PNEUMOC VAC/ADMIN/RCVD: CPT | Performed by: INTERNAL MEDICINE

## 2019-05-02 PROCEDURE — G8419 CALC BMI OUT NRM PARAM NOF/U: HCPCS | Performed by: INTERNAL MEDICINE

## 2019-05-02 PROCEDURE — 3017F COLORECTAL CA SCREEN DOC REV: CPT | Performed by: INTERNAL MEDICINE

## 2019-05-02 PROCEDURE — 4004F PT TOBACCO SCREEN RCVD TLK: CPT | Performed by: INTERNAL MEDICINE

## 2019-05-02 PROCEDURE — 99203 OFFICE O/P NEW LOW 30 MIN: CPT | Performed by: INTERNAL MEDICINE

## 2019-05-02 PROCEDURE — 1123F ACP DISCUSS/DSCN MKR DOCD: CPT | Performed by: INTERNAL MEDICINE

## 2019-05-02 PROCEDURE — G8427 DOCREV CUR MEDS BY ELIG CLIN: HCPCS | Performed by: INTERNAL MEDICINE

## 2019-05-02 NOTE — PROGRESS NOTES
Reason for Visit: cirrhosis    HPI: A 70year old the  male patient with a past medical history of alcohol abuse, cirrhosis, possible hepatitis C had come to my office to follow-up cirrhosis. Currently she still drinking alcohol. He drinks 2 or 3 beers per day. In the past he even drink more. He has been follow with Dr. Jennie Nolne for cirrhosis over past 10 years. However because of the change of insurance, he come to my office. Today she reported that he had been feeling fine. The patient denied heartburn, regurgitation,  dysphagia, odynophagia,nauseate, vomiting, abdominal bloating, abdominal gassy sensations, abdominal distention, abdominal girth increase, hematochezia, hematemesis, hematemesis, melena, weight loss, weight gain, rashes, jaundice, mental status change, itchiness on the skin, muscle skeleton joint pain, mouth ulcers, and blurred vision. He did not have abdominal pain, abdominal distention, confusion, disorientation, bleeding tendency, diarrhea, constipation. He mowed his bowels once per day and stools are formed and a yellowish. Based on his history, he did have a paracentesis 9 years ago and the paracentesis did not show SBP. Currently patient has been on Aldactone 50 mg per day and Lasix 40 mgPer day. Because sometime he had hypokalemia, she has been also taking potassium supplements. He denied a family history of colorectal cancer. He never had upper endoscopy and colonoscopy. She had an never had surveillance ultrasound of her liver his a most recent liver ultrasound was in 2010, which showed ascites and a cirrhosis. PMH:    Past Medical History:   Diagnosis Date    Cirrhosis of liver ([facility] [address]     ETOH abuse     Hepatitis C 1977    Skin cancer     nose, resected    Smoker        PSH:  No past surgical history on file.     Medications:    Current Outpatient Medications on File Prior to Visit   Medication Sig Dispense Refill    furosemide (LASIX) 40 MG tablet Take 1 tablet by mouth daily 30 tablet 3    spironolactone (ALDACTONE) 50 MG tablet Take 1 tablet by mouth daily 30 tablet 3    potassium chloride (KLOR-CON M) 20 MEQ extended release tablet Take 2 tablets by mouth 2 times daily 120 tablet 3     No current facility-administered medications on file prior to visit. Allergies: No Known Allergies    Social History:    Social History     Socioeconomic History    Marital status:      Spouse name: Not on file    Number of children: Not on file    Years of education: Not on file    Highest education level: Not on file   Occupational History    Not on file   Social Needs    Financial resource strain: Not on file    Food insecurity:     Worry: Not on file     Inability: Not on file    Transportation needs:     Medical: Not on file     Non-medical: Not on file   Tobacco Use    Smoking status: Current Every Day Smoker     Packs/day: 2.00     Years: 40.00     Pack years: 80.00     Types: Cigarettes    Smokeless tobacco: Never Used   Substance and Sexual Activity    Alcohol use:  Yes     Alcohol/week: 4.8 oz     Types: 8 Cans of beer per week     Comment: Heavy drinker in the past, 12 cans of beer and few shots of hard liquour  daily    Drug use: No    Sexual activity: Not Currently     Partners: Female   Lifestyle    Physical activity:     Days per week: Not on file     Minutes per session: Not on file    Stress: Not on file   Relationships    Social connections:     Talks on phone: Not on file     Gets together: Not on file     Attends Restoration service: Not on file     Active member of club or organization: Not on file     Attends meetings of clubs or organizations: Not on file     Relationship status: Not on file    Intimate partner violence:     Fear of current or ex partner: Not on file     Emotionally abused: Not on file     Physically abused: Not on file     Forced sexual activity: Not on file   Other Topics Concern    Not on file   Social History Narrative    Not on file       Family History:  No family history on file. Review of Systems:  Constitutional: No weight loss, no fevers. Eyes: No problems with vision. ENT: No nose or sinus problems, no oral problems, no throat problems or hoarseness. Cardiovascular: No chest pain, no leg pain with walking, no palpitations, no ankle swelling. Respiratory: No shortness of breath, no persistent cough, no wheezing. Endocrine: No increased thirst, no increased urination. Gastrointestinal: No heartburn, no dysphagia, no abdominal pain, no loss of appetite, no nausea or vomiting, no diarrhea, no constipation, no melena, no hematochezia, no sceleral icterus or jaundice. Skin: No rashes. Musculoskeletal: No trouble walking or standing, no joint pain, no muscle pain. Allergy/Immune System: No allergies, no frequent infections. Neurological: No memory difficulties, no temporary blindness, no difficulty speaking, no headaches, no numbness. Psychiatric: No depression, no suicidal ideation, no auditory hallucinations. Hematological/Lymphatic: No lymphadenopathy, no frequent nose bleeds, no easy bruising. Genitourinary: No penile/vaginal discharge, no pain with urination, no trouble starting urinary stream, no hematuria. Physical Examination  Vital Signs: There were no vitals taken for this visit. There is no height or weight on file to calculate BMI. General: The patient is a 70 y.o. male in No acute distress. EYE: EOMI, Gross visual field was normal. Pupils reactive, The conjunctive was normal, with no erythema. ENT: no lymphadenopathy, oropharynx is without erythema, edema, or exudates, and moist mucus membranes. The nasal mucosa, septum and turbinates were normal without inflammation or edema. Neck: There was no mass on palpitation, tracheal position was in the middle of the neck and there was no enlarged thyroid. There was no JVD. Lungs:  The respiratory was not in labor and the patient did not use accessory muscle. Clear to auscultation bilaterally, no wheeze/crackles. Cardiovascular: Regular rate and rhythm, normal S1 & S2, no murmurs, rubs or gallops appreciated. Peripheral pulses were normal and no tenderness. Abdomen: Soft, non-tender, no rebound or guarding or peritoneal features, no masses, no hepatosplenomegaly. Extremities: upper and lower extremities were warm and dry, no clubbing, cyanosis, edema. Neuro: CN II-XII were intact grossly. Sensation was normal on all extremities and the muscle strength was normal and symmetry. Rectum:  There was no fistular, fissure, external hemorrhoid, tenderness, abscess, erythema or discharge    Labs:  CBC  WBC   Date Value Ref Range Status   05/09/2017 4.4 4.0 - 10.5 K/CU MM Final     Hemoglobin   Date Value Ref Range Status   05/09/2017 14.9 13.5 - 18.0 GM/DL Final     Hematocrit   Date Value Ref Range Status   05/09/2017 42.7 42 - 52 % Final     MCV   Date Value Ref Range Status   05/09/2017 103.4 (H) 78 - 100 FL Final        Glucose   Date Value Ref Range Status   10/18/2018 123 (H) 70 - 99 MG/DL Final     CO2   Date Value Ref Range Status   10/18/2018 26 21 - 32 MMOL/L Final     BUN   Date Value Ref Range Status   10/18/2018 7 6 - 23 MG/DL Final     Lab Results   Component Value Date    ALT 24 08/08/2017    AST 55 08/08/2017     Lab Results   Component Value Date    AMYLASE 68 12/11/2010     Lab Results   Component Value Date    LIPASE 38 12/11/2010     No results found for: ESR  No components found for: CREACTIVEPR  No results found for: FERNIE No components found for: ANTISMA, ANTIMITO  No results found for: CEA  No components found for: OCCULTBLOOD, OCCBLDSINPOC, OCCULTBLOODS, OCCBLD1, OCCBLD2, OCCBLD3, OCCULTBLOOD  No results found for: IRON, FERRITIN  No results found for: HAV    Assessment     Assessment and Plan:    A 70year old the  male patient with a past medical history of alcohol abuse, cirrhosis, possible hepatitis C had come to my office to follow-up cirrhosis. 1.  Cirrhosis might be due to alcohol. I strongly recommend stopping drinking alcohol. I also recommend liver ultrasound to cirrhosis surveillance. I explained to him the purpose of liver ultrasound study The early stage of liver nodule mass or even liver cancer and the patient refused. The health order AFP although AFP is not recommended test for liver cancer surveillance. I also order other blood works to rule out other etiologies of cirrhosis such as autoimmune hepatitis, Preet disease, hemachromatosis, viral hepatitis etc.    2.  Because he had a cirrhosis I recommend an upper endoscopy to do varices surveillance, the patient refused although I had explained to him the purpose of upper endoscopies to detect the soft varices. And also I told him the consequences of esophageal varices bleeding which might lead to death. 3.  The patient hadn't had a screening colonoscopy. I recommended screening colonoscopy to detect colon polyps or early stage colon cancer. The patient declined although I had already explained to him that purpose of colonoscopy is for detecting colon polyps or early stage of colon cancer. Therefore I ordered a FIT test    4. History of ascites due to cirrhosis. I asked him to continue Aldactone 50 mg per day and Lasix 40 mg per day. Regarding his a potassium supplements, I had not refill his a potassium supplements because I'm concerned the combination of potassium supplements and Aldactone might lead to hyperkalemia. I preferred to check his BMP every 4 weeks. If his potassium is lower I would give him a short course of  potassium supplement at that time. 5.  History of abnormal liver function test in 2017 was mostly secondary to cirrhosis.   Today I rechecked the patient liver function test.     6.  Because she has cirrhosis, I recommend visiting his primary care doctor to receive hepatitis A and hepatitis B vaccinations    I'm going

## 2019-05-21 ENCOUNTER — OFFICE VISIT (OUTPATIENT)
Dept: FAMILY MEDICINE CLINIC | Age: 64
End: 2019-05-21
Payer: MEDICARE

## 2019-05-21 VITALS
BODY MASS INDEX: 25.93 KG/M2 | HEART RATE: 90 BPM | OXYGEN SATURATION: 95 % | WEIGHT: 191.2 LBS | SYSTOLIC BLOOD PRESSURE: 136 MMHG | DIASTOLIC BLOOD PRESSURE: 76 MMHG

## 2019-05-21 DIAGNOSIS — Z13.220 LIPID SCREENING: ICD-10-CM

## 2019-05-21 DIAGNOSIS — Z13.29 THYROID DISORDER SCREEN: ICD-10-CM

## 2019-05-21 DIAGNOSIS — Z12.11 COLON CANCER SCREENING: ICD-10-CM

## 2019-05-21 DIAGNOSIS — K70.30 ALCOHOLIC CIRRHOSIS, UNSPECIFIED WHETHER ASCITES PRESENT (HCC): Primary | ICD-10-CM

## 2019-05-21 DIAGNOSIS — F17.200 TOBACCO DEPENDENCE: ICD-10-CM

## 2019-05-21 DIAGNOSIS — R73.9 HYPERGLYCEMIA: ICD-10-CM

## 2019-05-21 PROCEDURE — 1123F ACP DISCUSS/DSCN MKR DOCD: CPT | Performed by: FAMILY MEDICINE

## 2019-05-21 PROCEDURE — 4004F PT TOBACCO SCREEN RCVD TLK: CPT | Performed by: FAMILY MEDICINE

## 2019-05-21 PROCEDURE — 99214 OFFICE O/P EST MOD 30 MIN: CPT | Performed by: FAMILY MEDICINE

## 2019-05-21 PROCEDURE — G8427 DOCREV CUR MEDS BY ELIG CLIN: HCPCS | Performed by: FAMILY MEDICINE

## 2019-05-21 PROCEDURE — G8419 CALC BMI OUT NRM PARAM NOF/U: HCPCS | Performed by: FAMILY MEDICINE

## 2019-05-21 PROCEDURE — 4040F PNEUMOC VAC/ADMIN/RCVD: CPT | Performed by: FAMILY MEDICINE

## 2019-05-21 PROCEDURE — 3017F COLORECTAL CA SCREEN DOC REV: CPT | Performed by: FAMILY MEDICINE

## 2019-05-21 RX ORDER — POTASSIUM CHLORIDE 20 MEQ/1
20 TABLET, EXTENDED RELEASE ORAL 2 TIMES DAILY
Refills: 3 | COMMUNITY
Start: 2019-05-14 | End: 2019-11-26

## 2019-05-21 ASSESSMENT — PATIENT HEALTH QUESTIONNAIRE - PHQ9
SUM OF ALL RESPONSES TO PHQ9 QUESTIONS 1 & 2: 0
2. FEELING DOWN, DEPRESSED OR HOPELESS: 0
1. LITTLE INTEREST OR PLEASURE IN DOING THINGS: 0
SUM OF ALL RESPONSES TO PHQ QUESTIONS 1-9: 0
SUM OF ALL RESPONSES TO PHQ QUESTIONS 1-9: 0

## 2019-05-21 NOTE — PROGRESS NOTES
activity: Not on file   Other Topics Concern    Not on file   Social History Narrative    Not on file       No Known Allergies  Current Outpatient Medications   Medication Sig Dispense Refill    potassium chloride (KLOR-CON M) 20 MEQ extended release tablet Take 20 mEq by mouth 2 times daily  3    furosemide (LASIX) 40 MG tablet Take 1 tablet by mouth daily 30 tablet 3    spironolactone (ALDACTONE) 50 MG tablet Take 1 tablet by mouth daily 30 tablet 3     No current facility-administered medications for this visit. Review of Systems   Constitutional: Negative for activity change, appetite change, chills, fatigue and fever. HENT: Negative for congestion, postnasal drip, sinus pressure and sore throat. Respiratory: Negative for cough, chest tightness, shortness of breath and wheezing. Cardiovascular: Negative for chest pain and leg swelling. Gastrointestinal: Negative for abdominal pain, constipation and diarrhea. Genitourinary: Negative for dysuria and frequency. Musculoskeletal: Negative for back pain and gait problem. Skin: Negative for rash. Neurological: Negative for dizziness, weakness and headaches. Psychiatric/Behavioral: Negative for agitation and behavioral problems. The patient is not nervous/anxious.         Lab Results   Component Value Date    WBC 4.7 05/29/2019    HGB 14.9 05/29/2019    HCT 44.0 05/29/2019    .5 (H) 05/29/2019    PLT 97  RESULTS CONFIRMED BY SMEAR REVIEW   (L) 05/29/2019     Lab Results   Component Value Date     05/29/2019    K 3.8 05/29/2019     05/29/2019    CO2 26 05/29/2019    BUN 8 05/29/2019    CREATININE 0.8 (L) 05/29/2019    GLUCOSE 123 (H) 10/18/2018    CALCIUM 8.5 05/29/2019    PROT 7.2 05/29/2019    PROT 7.2 05/29/2019    LABALBU 3.4 05/29/2019    LABALBU 3.3 (L) 05/29/2019    BILITOT 2.6 (H) 05/29/2019    BILITOT 2.6 (H) 05/29/2019    ALKPHOS 171 (H) 05/29/2019    ALKPHOS 175 (H) 05/29/2019    AST 58 (H) 05/29/2019    AST 57 (H) 05/29/2019    ALT 27 05/29/2019    ALT 27 05/29/2019    LABGLOM >60 05/29/2019    GFRAA >60 05/29/2019     Lab Results   Component Value Date    CHOL 102 05/29/2019    CHOL 133 01/19/2011     Lab Results   Component Value Date    TRIG 62 05/29/2019    TRIG 58 01/19/2011     Lab Results   Component Value Date    HDL 37 (L) 05/29/2019    HDL 35 (L) 01/19/2011     Lab Results   Component Value Date    LDLCALC 86 01/19/2011     Lab Results   Component Value Date    LABA1C 5.2 05/29/2019     Lab Results   Component Value Date    TSHHS 2.220 05/29/2019         /76 (Site: Left Upper Arm, Position: Sitting, Cuff Size: Medium Adult)   Pulse 90   Wt 191 lb 3.2 oz (86.7 kg)   SpO2 95%   BMI 25.93 kg/m²     BP Readings from Last 3 Encounters:   05/21/19 136/76   05/02/19 118/76   02/23/19 118/80       Wt Readings from Last 3 Encounters:   05/21/19 191 lb 3.2 oz (86.7 kg)   05/02/19 188 lb (85.3 kg)   02/23/19 195 lb (88.5 kg)         Physical Exam   Constitutional: He is oriented to person, place, and time. He appears well-developed and well-nourished. No distress. HENT:   Head: Normocephalic and atraumatic. Eyes: Pupils are equal, round, and reactive to light. Conjunctivae and EOM are normal. No scleral icterus. Neck: Normal range of motion. Neck supple. Cardiovascular: Normal rate, regular rhythm and normal heart sounds. No murmur heard. Pulmonary/Chest: Effort normal and breath sounds normal. He has no wheezes. Musculoskeletal: Normal range of motion. He exhibits no edema. Neurological: He is alert and oriented to person, place, and time. Skin: He is not diaphoretic. Psychiatric: He has a normal mood and affect. His behavior is normal.       ASSESSMENT/ PLAN:    1. Alcoholic cirrhosis, unspecified whether ascites present ([address]  - Comprehensive Metabolic Panel; Future    2. Lipid screening  - Lipid Panel; Future    3.  Thyroid disorder screen  - T4, Free; Future  - TSH without Reflex; Future    4. Hyperglycemia  - Hemoglobin A1C; Future    5. Tobacco dependence  - encourage smoking cessation    6. Colon cancer screening  - POCT Fecal Immunochemical Test (FIT); Future                - Appropriateprescription are addressed. - After visit summery provided. - Questions answered and patient verbalizes understanding.  - Call for any problem, questions, or concerns. Return in about 6 months (around 11/21/2019).

## 2019-05-29 ENCOUNTER — HOSPITAL ENCOUNTER (OUTPATIENT)
Age: 64
Discharge: HOME | End: 2019-05-29
Payer: MEDICARE

## 2019-05-29 ENCOUNTER — TELEPHONE (OUTPATIENT)
Dept: GASTROENTEROLOGY | Age: 64
End: 2019-05-29

## 2019-05-29 DIAGNOSIS — E83.118 OTHER HEMOCHROMATOSIS: Primary | ICD-10-CM

## 2019-05-29 LAB
ALBUMIN SERPL-MCNC: 3.3 GM/DL (ref 3.4–5)
ALBUMIN SERPL-MCNC: 3.4 GM/DL (ref 3.4–5)
ALP BLD-CCNC: 171 IU/L (ref 40–129)
ALP BLD-CCNC: 175 IU/L (ref 40–128)
ALT SERPL-CCNC: 27 U/L (ref 10–40)
ALT SERPL-CCNC: 27 U/L (ref 10–40)
ANION GAP SERPL CALCULATED.3IONS-SCNC: 12 MMOL/L (ref 4–16)
AST SERPL-CCNC: 57 IU/L (ref 15–37)
AST SERPL-CCNC: 58 IU/L (ref 15–37)
BASOPHILS ABSOLUTE: 0 K/CU MM
BASOPHILS RELATIVE PERCENT: 0.6 % (ref 0–1)
BILIRUB SERPL-MCNC: 2.6 MG/DL (ref 0–1)
BILIRUB SERPL-MCNC: 2.6 MG/DL (ref 0–1)
BILIRUBIN DIRECT: 1 MG/DL (ref 0–0.3)
BILIRUBIN, INDIRECT: 1.6 MG/DL (ref 0–0.7)
BUN BLDV-MCNC: 8 MG/DL (ref 6–23)
CALCIUM SERPL-MCNC: 8.5 MG/DL (ref 8.3–10.6)
CHLORIDE BLD-SCNC: 103 MMOL/L (ref 99–110)
CHOLESTEROL: 102 MG/DL
CO2: 26 MMOL/L (ref 21–32)
CREAT SERPL-MCNC: 0.8 MG/DL (ref 0.9–1.3)
DIFFERENTIAL TYPE: ABNORMAL
EOSINOPHILS ABSOLUTE: 0.2 K/CU MM
EOSINOPHILS RELATIVE PERCENT: 3.4 % (ref 0–3)
ESTIMATED AVERAGE GLUCOSE: 103 MG/DL
FERRITIN: 332 NG/ML (ref 30–400)
GFR AFRICAN AMERICAN: >60 ML/MIN/1.73M2
GFR NON-AFRICAN AMERICAN: >60 ML/MIN/1.73M2
GLUCOSE FASTING: 113 MG/DL (ref 70–99)
HAV IGM SER IA-ACNC: NON REACTIVE
HBA1C MFR BLD: 5.2 % (ref 4.2–6.3)
HBV SURFACE AB TITR SER: 229.3 {TITER}
HCT VFR BLD CALC: 44 % (ref 42–52)
HDLC SERPL-MCNC: 37 MG/DL
HEMOGLOBIN: 14.9 GM/DL (ref 13.5–18)
HEPATITIS B SURFACE ANTIGEN: NON REACTIVE
HEPATITIS C ANTIBODY: NON REACTIVE
IGA: 713 MG/DL (ref 69–382)
IGG,SERUM: 2249 MG/DL (ref 723–1685)
IGM,SERUM: 256 MG/DL (ref 62–277)
IMMATURE NEUTROPHIL %: 0.2 % (ref 0–0.43)
INR BLD: 1.33 INDEX
IRON: 223 UG/DL (ref 59–158)
LDL CHOLESTEROL DIRECT: 71 MG/DL
LYMPHOCYTES ABSOLUTE: 1.9 K/CU MM
LYMPHOCYTES RELATIVE PERCENT: 40.6 % (ref 24–44)
MCH RBC QN AUTO: 35.4 PG (ref 27–31)
MCHC RBC AUTO-ENTMCNC: 33.9 % (ref 32–36)
MCV RBC AUTO: 104.5 FL (ref 78–100)
MONOCYTES ABSOLUTE: 0.6 K/CU MM
MONOCYTES RELATIVE PERCENT: 13.2 % (ref 0–4)
NUCLEATED RBC %: 0 %
PCT TRANSFERRIN: 97 % (ref 10–44)
PDW BLD-RTO: 14.9 % (ref 11.7–14.9)
PLATELET # BLD: ABNORMAL K/CU MM (ref 140–440)
PMV BLD AUTO: 11.7 FL (ref 7.5–11.1)
POTASSIUM SERPL-SCNC: 3.8 MMOL/L (ref 3.5–5.1)
PROTHROMBIN TIME: 15.4 SECONDS (ref 9.12–12.5)
RBC # BLD: 4.21 M/CU MM (ref 4.6–6.2)
SEGMENTED NEUTROPHILS ABSOLUTE COUNT: 2 K/CU MM
SEGMENTED NEUTROPHILS RELATIVE PERCENT: 42 % (ref 36–66)
SODIUM BLD-SCNC: 141 MMOL/L (ref 135–145)
T4 FREE: 0.81 NG/DL (ref 0.9–1.8)
TOTAL IMMATURE NEUTOROPHIL: 0.01 K/CU MM
TOTAL IRON BINDING CAPACITY: 230 UG/DL (ref 250–450)
TOTAL NUCLEATED RBC: 0 K/CU MM
TOTAL PROTEIN: 7.2 GM/DL (ref 6.4–8.2)
TOTAL PROTEIN: 7.2 GM/DL (ref 6.4–8.2)
TRIGL SERPL-MCNC: 62 MG/DL
TSH HIGH SENSITIVITY: 2.22 UIU/ML (ref 0.27–4.2)
UNSATURATED IRON BINDING CAPACITY: 7 UG/DL (ref 110–370)
WBC # BLD: 4.7 K/CU MM (ref 4–10.5)

## 2019-05-29 PROCEDURE — 82728 ASSAY OF FERRITIN: CPT

## 2019-05-29 PROCEDURE — 82105 ALPHA-FETOPROTEIN SERUM: CPT

## 2019-05-29 PROCEDURE — 86708 HEPATITIS A ANTIBODY: CPT

## 2019-05-29 PROCEDURE — 36415 COLL VENOUS BLD VENIPUNCTURE: CPT

## 2019-05-29 PROCEDURE — 86038 ANTINUCLEAR ANTIBODIES: CPT

## 2019-05-29 PROCEDURE — 86803 HEPATITIS C AB TEST: CPT

## 2019-05-29 PROCEDURE — 82104 ALPHA-1-ANTITRYPSIN PHENO: CPT

## 2019-05-29 PROCEDURE — 83516 IMMUNOASSAY NONANTIBODY: CPT

## 2019-05-29 PROCEDURE — 83036 HEMOGLOBIN GLYCOSYLATED A1C: CPT

## 2019-05-29 PROCEDURE — 86709 HEPATITIS A IGM ANTIBODY: CPT

## 2019-05-29 PROCEDURE — 85610 PROTHROMBIN TIME: CPT

## 2019-05-29 PROCEDURE — 82784 ASSAY IGA/IGD/IGG/IGM EACH: CPT

## 2019-05-29 PROCEDURE — 82390 ASSAY OF CERULOPLASMIN: CPT

## 2019-05-29 PROCEDURE — 86256 FLUORESCENT ANTIBODY TITER: CPT

## 2019-05-29 PROCEDURE — 84439 ASSAY OF FREE THYROXINE: CPT

## 2019-05-29 PROCEDURE — 84443 ASSAY THYROID STIM HORMONE: CPT

## 2019-05-29 PROCEDURE — 83540 ASSAY OF IRON: CPT

## 2019-05-29 PROCEDURE — 83550 IRON BINDING TEST: CPT

## 2019-05-29 PROCEDURE — 86704 HEP B CORE ANTIBODY TOTAL: CPT

## 2019-05-29 PROCEDURE — 85025 COMPLETE CBC W/AUTO DIFF WBC: CPT

## 2019-05-29 PROCEDURE — 83721 ASSAY OF BLOOD LIPOPROTEIN: CPT

## 2019-05-29 PROCEDURE — 80076 HEPATIC FUNCTION PANEL: CPT

## 2019-05-29 PROCEDURE — 87340 HEPATITIS B SURFACE AG IA: CPT

## 2019-05-29 PROCEDURE — 80061 LIPID PANEL: CPT

## 2019-05-29 PROCEDURE — 86706 HEP B SURFACE ANTIBODY: CPT

## 2019-05-29 PROCEDURE — 80053 COMPREHEN METABOLIC PANEL: CPT

## 2019-05-29 NOTE — TELEPHONE ENCOUNTER
I called him about the abnormal iron level and abnormal LFT. I highly recommended referring to the liver center in the [facility] (alternatively would be [facility]) and a hematologist.    I also asked him to do additional blood test for hemochromatosis.

## 2019-05-30 ENCOUNTER — TELEPHONE (OUTPATIENT)
Dept: GASTROENTEROLOGY | Age: 64
End: 2019-05-30

## 2019-05-30 DIAGNOSIS — K70.30 ALCOHOLIC CIRRHOSIS OF LIVER WITHOUT ASCITES (HCC): Primary | ICD-10-CM

## 2019-05-30 LAB
CERULOPLASMIN: 20 MG/DL (ref 17–54)
CERULOPLASMIN: NORMAL MG/DL (ref 17–54)
F-ACTIN AB, IGG: 20 UNITS (ref 0–19)
F-ACTIN AB, IGG: ABNORMAL UNITS (ref 0–19)
HAV AB SERPL IA-ACNC: NEGATIVE
HAV AB SERPL IA-ACNC: NORMAL
HEPATITIS B CORE TOTAL ANTIBODY: ABNORMAL
HEPATITIS B CORE TOTAL ANTIBODY: POSITIVE
MS ALPHA-FETOPROTEIN: 7 NG/ML (ref 0–9)
MS ALPHA-FETOPROTEIN: NORMAL NG/ML (ref 0–9)
TRANSGLUTAMINASE IGA: 2 U/ML (ref 0–3)

## 2019-05-30 NOTE — TELEPHONE ENCOUNTER
Pt was notified and expressed understanding . He said he will go today and do the blood work at the [address] [address] center. He said the hematologist already called him yesterday.

## 2019-05-31 ENCOUNTER — TELEPHONE (OUTPATIENT)
Dept: GASTROENTEROLOGY | Age: 64
End: 2019-05-31

## 2019-05-31 ENCOUNTER — HOSPITAL ENCOUNTER (OUTPATIENT)
Age: 64
Discharge: HOME | End: 2019-05-31
Payer: MEDICARE

## 2019-05-31 DIAGNOSIS — E83.118 OTHER HEMOCHROMATOSIS: ICD-10-CM

## 2019-05-31 DIAGNOSIS — K70.30 ALCOHOLIC CIRRHOSIS OF LIVER WITHOUT ASCITES (HCC): ICD-10-CM

## 2019-05-31 DIAGNOSIS — E88.01 ALPHA-1-ANTITRYPSIN DEFICIENCY (HCC): Primary | ICD-10-CM

## 2019-05-31 DIAGNOSIS — K70.30 ALCOHOLIC CIRRHOSIS OF LIVER WITHOUT ASCITES (HCC): Primary | ICD-10-CM

## 2019-05-31 LAB
ALPHA-1 ANTITRYPSIN PHENOTYPE: ABNORMAL
ALPHA-1 ANTITRYPSIN PHENOTYPE: ABNORMAL
ALPHA-1 ANTITRYPSIN: ABNORMAL MG/DL (ref 90–200)
ANTI-NUCLEAR ANTIBODY (ANA): NORMAL
ANTI-NUCLEAR ANTIBODY (ANA): NORMAL
HEPATITIS B CORE IGM ANTIBODY: NON REACTIVE
SMOOTH MUSCLE AB IGG TITER: NORMAL
SMOOTH MUSCLE AB IGG TITER: NORMAL

## 2019-05-31 PROCEDURE — 86705 HEP B CORE ANTIBODY IGM: CPT

## 2019-05-31 PROCEDURE — 36415 COLL VENOUS BLD VENIPUNCTURE: CPT

## 2019-05-31 PROCEDURE — 81256 HFE GENE: CPT

## 2019-05-31 NOTE — TELEPHONE ENCOUNTER
Please call him and reiterate that he has been referred to the [facility] hepatology for his complicated liver issue, not only due to alcohol, but also potentially related to hemochromatosis (iron problems) and autoimmune problems (based on the most recent blood works). I added one more additional blood tests for him. The other alternative referral would be [place].   But the [facility] is the best.    tere

## 2019-05-31 NOTE — TELEPHONE ENCOUNTER
Patent has been advise of the referred to the [facility] hepatology for his complicated liver issue, not only due to alcohol, but also potentially related to hemochromatosis (iron problems) and autoimmune problems (based on the most recent blood works). He sates he has appointment 6/7 with a Leobardo Michel referred him to he for got the name of the dr.he had all his blood work done today.

## 2019-05-31 NOTE — TELEPHONE ENCOUNTER
Patient does have a appointment 6/7 with Hematologist and referred was send to [facility].

## 2019-05-31 NOTE — TELEPHONE ENCOUNTER
Please let him know that when he is drawing blood for hemochromatosis, I added one additional HBV core ig M ab test that is also needed to be drawn.     tere

## 2019-05-31 NOTE — TELEPHONE ENCOUNTER
Let patient know more blood work was order patient state he went this moring 5/31 and had his blood work done.

## 2019-06-01 NOTE — TELEPHONE ENCOUNTER
Please call him and let him know that the further blood tests showed that he had alpha-anti-trypsin deficiency that potentially cause both liver and lung disease, particularly when he is current smoker. Therefore, I highly recommended:  (1) seeing a hepatologist in the [facility] liver center for her care of her complicated liver disease, which we had referred him. Once he receives a call from the [facility], he should make an appointment. (2) I also referred him to a local lung doctor     (3) he should seriously consider stopping smoking.     tere

## 2019-06-03 LAB
C282Y HEMOCHROMATOSIS MUT: NEGATIVE
H63D HEMOCHROMATOSIS MUT: NORMAL
HEMOCHROMATOSIS MUTATION C282Y/H63D: NORMAL
HEMOCHROMATOSIS MUTATION C282Y/H63D: NORMAL
HFE PCR SPECIMEN: NORMAL
S65C HEMOCHROMATOSIS MUT: NEGATIVE

## 2019-06-03 NOTE — TELEPHONE ENCOUNTER
Patient advised of results. Verbal understanding expressed. Pt agrees to schedule appts with the Hepatologist at [facility] and Dr. Ahmet Flores.

## 2019-06-04 ASSESSMENT — ENCOUNTER SYMPTOMS
ABDOMINAL PAIN: 0
SHORTNESS OF BREATH: 0
COUGH: 0
SORE THROAT: 0
BACK PAIN: 0
CONSTIPATION: 0
DIARRHEA: 0
CHEST TIGHTNESS: 0
WHEEZING: 0
SINUS PRESSURE: 0

## 2019-06-07 ENCOUNTER — HOSPITAL ENCOUNTER (OUTPATIENT)
Age: 64
Setting detail: SPECIMEN
Discharge: HOME | End: 2019-06-07
Payer: MEDICARE

## 2019-06-07 LAB
FERRITIN: 328 NG/ML (ref 30–400)
IRON: 99 UG/DL (ref 59–158)
PCT TRANSFERRIN: 46 % (ref 10–44)
TOTAL IRON BINDING CAPACITY: 215 UG/DL (ref 250–450)
UNSATURATED IRON BINDING CAPACITY: 116 UG/DL (ref 110–370)

## 2019-06-07 PROCEDURE — 83540 ASSAY OF IRON: CPT

## 2019-06-07 PROCEDURE — 82728 ASSAY OF FERRITIN: CPT

## 2019-06-07 PROCEDURE — 83550 IRON BINDING TEST: CPT

## 2019-06-10 DIAGNOSIS — K70.30 ALCOHOLIC CIRRHOSIS, UNSPECIFIED WHETHER ASCITES PRESENT (HCC): ICD-10-CM

## 2019-06-10 RX ORDER — SPIRONOLACTONE 50 MG/1
50 TABLET, FILM COATED ORAL DAILY
Qty: 30 TABLET | Refills: 3 | Status: SHIPPED | OUTPATIENT
Start: 2019-06-10 | End: 2019-11-26 | Stop reason: SDUPTHER

## 2019-06-10 RX ORDER — FUROSEMIDE 40 MG/1
40 TABLET ORAL DAILY
Qty: 30 TABLET | Refills: 3 | Status: SHIPPED | OUTPATIENT
Start: 2019-06-10 | End: 2019-11-26 | Stop reason: SDUPTHER

## 2019-06-17 ENCOUNTER — TELEPHONE (OUTPATIENT)
Dept: GASTROENTEROLOGY | Age: 64
End: 2019-06-17

## 2019-06-17 DIAGNOSIS — K70.30 ALCOHOLIC CIRRHOSIS, UNSPECIFIED WHETHER ASCITES PRESENT (HCC): Primary | ICD-10-CM

## 2019-06-17 NOTE — TELEPHONE ENCOUNTER
Pt has been on potassium for 8.5 yrs. He knows what he's talking about, he said. He knows he needs to continue taking potassium. He agreed to have lab drawn tomorrow morning at the imaging center. I Faxed the lab order to [facility].

## 2019-06-17 NOTE — TELEPHONE ENCOUNTER
I will check his potassium now. If it is low, I will give to him. Otherwise, he does not need potassium now.     tere

## 2019-06-17 NOTE — TELEPHONE ENCOUNTER
Patient called and is concerned about not receiving his potassium chloride. He asked if you could please call it in to his pharmacy. Thank you.

## 2019-06-18 ENCOUNTER — TELEPHONE (OUTPATIENT)
Dept: GASTROENTEROLOGY | Age: 64
End: 2019-06-18

## 2019-06-18 ENCOUNTER — HOSPITAL ENCOUNTER (OUTPATIENT)
Age: 64
Discharge: HOME | End: 2019-06-18
Payer: MEDICARE

## 2019-06-18 DIAGNOSIS — K70.30 ALCOHOLIC CIRRHOSIS, UNSPECIFIED WHETHER ASCITES PRESENT (HCC): Primary | ICD-10-CM

## 2019-06-18 LAB
ANION GAP SERPL CALCULATED.3IONS-SCNC: 14 MMOL/L (ref 4–16)
BUN BLDV-MCNC: 5 MG/DL (ref 6–23)
CALCIUM SERPL-MCNC: 8.4 MG/DL (ref 8.3–10.6)
CHLORIDE BLD-SCNC: 102 MMOL/L (ref 99–110)
CO2: 25 MMOL/L (ref 21–32)
CREAT SERPL-MCNC: 0.8 MG/DL (ref 0.9–1.3)
GFR AFRICAN AMERICAN: >60 ML/MIN/1.73M2
GFR NON-AFRICAN AMERICAN: >60 ML/MIN/1.73M2
GLUCOSE BLD-MCNC: 108 MG/DL (ref 70–99)
POTASSIUM SERPL-SCNC: 3.4 MMOL/L (ref 3.5–5.1)
SODIUM BLD-SCNC: 141 MMOL/L (ref 135–145)

## 2019-06-18 PROCEDURE — 36415 COLL VENOUS BLD VENIPUNCTURE: CPT

## 2019-06-18 PROCEDURE — 80048 BASIC METABOLIC PNL TOTAL CA: CPT

## 2019-06-18 RX ORDER — POTASSIUM CHLORIDE 750 MG/1
10 TABLET, EXTENDED RELEASE ORAL DAILY
Qty: 7 TABLET | Refills: 0 | Status: SHIPPED | OUTPATIENT
Start: 2019-06-18 | End: 2019-11-26 | Stop reason: SDUPTHER

## 2019-06-18 NOTE — TELEPHONE ENCOUNTER
Patient was notified and expressed understanding. He said he has been feeling the symptoms that his potassium is low so he went and got OTC potassium 99 mg Natures bounty and has been taking that. He stated if he hadn't his levels would have been even lower.

## 2019-06-18 NOTE — TELEPHONE ENCOUNTER
Please call him and I will call in potassium for 7 days. The 7 days later, I will recheck his potassium. Today's potassium was just slightly low (normal number is more than 3.5.   His was 3.4)

## 2019-07-16 ENCOUNTER — TELEPHONE (OUTPATIENT)
Dept: GASTROENTEROLOGY | Age: 64
End: 2019-07-16

## 2019-07-22 ENCOUNTER — TELEPHONE (OUTPATIENT)
Dept: GASTROENTEROLOGY | Age: 64
End: 2019-07-22

## 2019-07-22 NOTE — TELEPHONE ENCOUNTER
Due to provider leaving the practice, the patient was informed of his appt being cancelled on 8/12/19. He agreed to seek a new GI provider.

## 2019-09-09 ENCOUNTER — HOSPITAL ENCOUNTER (OUTPATIENT)
Age: 64
Setting detail: SPECIMEN
Discharge: HOME | End: 2019-09-09
Payer: MEDICAID

## 2019-09-09 LAB
ALBUMIN SERPL-MCNC: 3.5 GM/DL (ref 3.4–5)
ALP BLD-CCNC: 170 IU/L (ref 40–129)
ALT SERPL-CCNC: 27 U/L (ref 10–40)
ANION GAP SERPL CALCULATED.3IONS-SCNC: 10 MMOL/L (ref 4–16)
AST SERPL-CCNC: 65 IU/L (ref 15–37)
BILIRUB SERPL-MCNC: 3 MG/DL (ref 0–1)
BUN BLDV-MCNC: 6 MG/DL (ref 6–23)
CALCIUM SERPL-MCNC: 8.8 MG/DL (ref 8.3–10.6)
CHLORIDE BLD-SCNC: 103 MMOL/L (ref 99–110)
CO2: 29 MMOL/L (ref 21–32)
CREAT SERPL-MCNC: 0.8 MG/DL (ref 0.9–1.3)
FERRITIN: 246 NG/ML (ref 30–400)
GFR AFRICAN AMERICAN: >60 ML/MIN/1.73M2
GFR NON-AFRICAN AMERICAN: >60 ML/MIN/1.73M2
GLUCOSE BLD-MCNC: 132 MG/DL (ref 70–99)
IRON: ABNORMAL UG/DL (ref 59–158)
PCT TRANSFERRIN: ABNORMAL % (ref 10–44)
POTASSIUM SERPL-SCNC: 4.6 MMOL/L (ref 3.5–5.1)
SODIUM BLD-SCNC: 142 MMOL/L (ref 135–145)
TOTAL IRON BINDING CAPACITY: ABNORMAL UG/DL (ref 250–450)
TOTAL PROTEIN: 7.6 GM/DL (ref 6.4–8.2)
UNSATURATED IRON BINDING CAPACITY: <17 UG/DL (ref 110–370)

## 2019-09-09 PROCEDURE — 83540 ASSAY OF IRON: CPT

## 2019-09-09 PROCEDURE — 83550 IRON BINDING TEST: CPT

## 2019-09-09 PROCEDURE — 82728 ASSAY OF FERRITIN: CPT

## 2019-09-09 PROCEDURE — 80053 COMPREHEN METABOLIC PANEL: CPT

## 2019-10-07 ENCOUNTER — HOSPITAL ENCOUNTER (OUTPATIENT)
Dept: INFUSION THERAPY | Age: 64
Setting detail: INFUSION SERIES
End: 2019-10-07
Payer: MEDICAID

## 2019-11-26 ENCOUNTER — OFFICE VISIT (OUTPATIENT)
Dept: FAMILY MEDICINE CLINIC | Age: 64
End: 2019-11-26
Payer: MEDICAID

## 2019-11-26 VITALS
SYSTOLIC BLOOD PRESSURE: 140 MMHG | HEART RATE: 111 BPM | BODY MASS INDEX: 27.07 KG/M2 | TEMPERATURE: 98.5 F | DIASTOLIC BLOOD PRESSURE: 85 MMHG | WEIGHT: 199.6 LBS | OXYGEN SATURATION: 94 %

## 2019-11-26 DIAGNOSIS — Z28.21 REFUSED STREPTOCOCCUS PNEUMONIAE VACCINATION: ICD-10-CM

## 2019-11-26 DIAGNOSIS — K70.30 ALCOHOLIC CIRRHOSIS, UNSPECIFIED WHETHER ASCITES PRESENT (HCC): Primary | ICD-10-CM

## 2019-11-26 DIAGNOSIS — Z12.11 COLON CANCER SCREENING: ICD-10-CM

## 2019-11-26 PROCEDURE — 3017F COLORECTAL CA SCREEN DOC REV: CPT | Performed by: FAMILY MEDICINE

## 2019-11-26 PROCEDURE — 4040F PNEUMOC VAC/ADMIN/RCVD: CPT | Performed by: FAMILY MEDICINE

## 2019-11-26 PROCEDURE — G8417 CALC BMI ABV UP PARAM F/U: HCPCS | Performed by: FAMILY MEDICINE

## 2019-11-26 PROCEDURE — 4004F PT TOBACCO SCREEN RCVD TLK: CPT | Performed by: FAMILY MEDICINE

## 2019-11-26 PROCEDURE — G8427 DOCREV CUR MEDS BY ELIG CLIN: HCPCS | Performed by: FAMILY MEDICINE

## 2019-11-26 PROCEDURE — 99213 OFFICE O/P EST LOW 20 MIN: CPT | Performed by: FAMILY MEDICINE

## 2019-11-26 PROCEDURE — 1123F ACP DISCUSS/DSCN MKR DOCD: CPT | Performed by: FAMILY MEDICINE

## 2019-11-26 PROCEDURE — G8484 FLU IMMUNIZE NO ADMIN: HCPCS | Performed by: FAMILY MEDICINE

## 2019-11-26 RX ORDER — SPIRONOLACTONE 50 MG/1
50 TABLET, FILM COATED ORAL DAILY
Qty: 30 TABLET | Refills: 5 | Status: SHIPPED | OUTPATIENT
Start: 2019-11-26 | End: 2020-01-01 | Stop reason: SDUPTHER

## 2019-11-26 RX ORDER — FUROSEMIDE 40 MG/1
40 TABLET ORAL DAILY
Qty: 30 TABLET | Refills: 5 | Status: SHIPPED | OUTPATIENT
Start: 2019-11-26 | End: 2020-01-01 | Stop reason: SDUPTHER

## 2019-11-26 RX ORDER — POTASSIUM CHLORIDE 750 MG/1
10 TABLET, EXTENDED RELEASE ORAL DAILY
Qty: 30 TABLET | Refills: 5 | Status: SHIPPED | OUTPATIENT
Start: 2019-11-26 | End: 2020-01-01 | Stop reason: SDUPTHER

## 2019-11-27 PROBLEM — Z28.21 REFUSED STREPTOCOCCUS PNEUMONIAE VACCINATION: Status: ACTIVE | Noted: 2019-11-27

## 2019-11-27 ASSESSMENT — ENCOUNTER SYMPTOMS
DIARRHEA: 0
WHEEZING: 0
COUGH: 0
SHORTNESS OF BREATH: 0
CHEST TIGHTNESS: 0
NAUSEA: 0
BACK PAIN: 0
ABDOMINAL PAIN: 0

## 2019-12-26 ENCOUNTER — HOSPITAL ENCOUNTER (OUTPATIENT)
Age: 64
Setting detail: SPECIMEN
Discharge: HOME | End: 2019-12-26
Payer: MEDICAID

## 2019-12-26 LAB
ALBUMIN SERPL-MCNC: 2.9 GM/DL (ref 3.4–5)
ALP BLD-CCNC: 191 IU/L (ref 40–129)
ALT SERPL-CCNC: 31 U/L (ref 10–40)
ANION GAP SERPL CALCULATED.3IONS-SCNC: 13 MMOL/L (ref 4–16)
AST SERPL-CCNC: 73 IU/L (ref 15–37)
BILIRUB SERPL-MCNC: 2.3 MG/DL (ref 0–1)
BUN BLDV-MCNC: 6 MG/DL (ref 6–23)
CALCIUM SERPL-MCNC: 8.3 MG/DL (ref 8.3–10.6)
CHLORIDE BLD-SCNC: 99 MMOL/L (ref 99–110)
CO2: 25 MMOL/L (ref 21–32)
CREAT SERPL-MCNC: 0.7 MG/DL (ref 0.9–1.3)
FERRITIN: 378 NG/ML (ref 30–400)
GFR AFRICAN AMERICAN: >60 ML/MIN/1.73M2
GFR NON-AFRICAN AMERICAN: >60 ML/MIN/1.73M2
GLUCOSE BLD-MCNC: 192 MG/DL (ref 70–99)
IRON: 159 UG/DL (ref 59–158)
PCT TRANSFERRIN: 79 % (ref 10–44)
POTASSIUM SERPL-SCNC: 3.7 MMOL/L (ref 3.5–5.1)
SODIUM BLD-SCNC: 137 MMOL/L (ref 135–145)
TOTAL IRON BINDING CAPACITY: 201 UG/DL (ref 250–450)
TOTAL PROTEIN: 7.7 GM/DL (ref 6.4–8.2)
UNSATURATED IRON BINDING CAPACITY: 42 UG/DL (ref 110–370)

## 2019-12-26 PROCEDURE — 80053 COMPREHEN METABOLIC PANEL: CPT

## 2019-12-26 PROCEDURE — 82728 ASSAY OF FERRITIN: CPT

## 2019-12-26 PROCEDURE — 83540 ASSAY OF IRON: CPT

## 2019-12-26 PROCEDURE — 83550 IRON BINDING TEST: CPT

## 2020-01-01 ENCOUNTER — APPOINTMENT (OUTPATIENT)
Dept: CT IMAGING | Age: 65
DRG: 871 | End: 2020-01-01
Payer: MEDICARE

## 2020-01-01 ENCOUNTER — APPOINTMENT (OUTPATIENT)
Dept: ULTRASOUND IMAGING | Age: 65
DRG: 871 | End: 2020-01-01
Payer: MEDICARE

## 2020-01-01 ENCOUNTER — TELEPHONE (OUTPATIENT)
Dept: FAMILY MEDICINE CLINIC | Age: 65
End: 2020-01-01

## 2020-01-01 ENCOUNTER — APPOINTMENT (OUTPATIENT)
Dept: GENERAL RADIOLOGY | Age: 65
DRG: 871 | End: 2020-01-01
Payer: MEDICARE

## 2020-01-01 ENCOUNTER — HOSPITAL ENCOUNTER (OUTPATIENT)
Dept: CT IMAGING | Age: 65
Discharge: HOME | DRG: 871 | End: 2020-12-28
Payer: MEDICARE

## 2020-01-01 ENCOUNTER — HOSPITAL ENCOUNTER (INPATIENT)
Age: 65
LOS: 2 days | Discharge: ANOTHER HOSPITAL | DRG: 871 | End: 2020-12-31
Attending: EMERGENCY MEDICINE | Admitting: HOSPITALIST
Payer: MEDICARE

## 2020-01-01 ENCOUNTER — OFFICE VISIT (OUTPATIENT)
Dept: ONCOLOGY | Age: 65
End: 2020-01-01
Payer: COMMERCIAL

## 2020-01-01 ENCOUNTER — HOSPITAL ENCOUNTER (EMERGENCY)
Age: 65
Discharge: HOME | DRG: 871 | End: 2020-12-26
Attending: EMERGENCY MEDICINE
Payer: MEDICARE

## 2020-01-01 ENCOUNTER — HOSPITAL ENCOUNTER (OUTPATIENT)
Dept: INFUSION THERAPY | Age: 65
Discharge: HOME | End: 2020-09-25
Payer: COMMERCIAL

## 2020-01-01 ENCOUNTER — OFFICE VISIT (OUTPATIENT)
Dept: FAMILY MEDICINE CLINIC | Age: 65
End: 2020-01-01
Payer: MEDICAID

## 2020-01-01 VITALS
DIASTOLIC BLOOD PRESSURE: 79 MMHG | OXYGEN SATURATION: 97 % | SYSTOLIC BLOOD PRESSURE: 125 MMHG | HEART RATE: 107 BPM | BODY MASS INDEX: 25.66 KG/M2 | WEIGHT: 189.2 LBS

## 2020-01-01 VITALS
DIASTOLIC BLOOD PRESSURE: 80 MMHG | BODY MASS INDEX: 26.22 KG/M2 | HEART RATE: 97 BPM | OXYGEN SATURATION: 94 % | WEIGHT: 193.6 LBS | TEMPERATURE: 98.7 F | SYSTOLIC BLOOD PRESSURE: 139 MMHG | HEIGHT: 72 IN

## 2020-01-01 VITALS
SYSTOLIC BLOOD PRESSURE: 100 MMHG | RESPIRATION RATE: 7 BRPM | HEART RATE: 74 BPM | OXYGEN SATURATION: 94 % | DIASTOLIC BLOOD PRESSURE: 50 MMHG | WEIGHT: 217.59 LBS | TEMPERATURE: 97.7 F | BODY MASS INDEX: 29.47 KG/M2 | HEIGHT: 72 IN

## 2020-01-01 VITALS
HEIGHT: 72 IN | BODY MASS INDEX: 25.19 KG/M2 | HEART RATE: 87 BPM | RESPIRATION RATE: 21 BRPM | DIASTOLIC BLOOD PRESSURE: 93 MMHG | WEIGHT: 186 LBS | OXYGEN SATURATION: 96 % | TEMPERATURE: 97.9 F | SYSTOLIC BLOOD PRESSURE: 126 MMHG

## 2020-01-01 DIAGNOSIS — R17 JAUNDICE: ICD-10-CM

## 2020-01-01 DIAGNOSIS — K72.00 SUBACUTE LIVER FAILURE WITHOUT HEPATIC COMA: Primary | ICD-10-CM

## 2020-01-01 DIAGNOSIS — K59.00 CONSTIPATION, UNSPECIFIED CONSTIPATION TYPE: ICD-10-CM

## 2020-01-01 DIAGNOSIS — J18.9 PNEUMONIA DUE TO ORGANISM: ICD-10-CM

## 2020-01-01 DIAGNOSIS — E83.110 HEREDITARY HEMOCHROMATOSIS (HCC): ICD-10-CM

## 2020-01-01 DIAGNOSIS — K70.30 ALCOHOLIC CIRRHOSIS, UNSPECIFIED WHETHER ASCITES PRESENT (HCC): ICD-10-CM

## 2020-01-01 DIAGNOSIS — M79.89 LEG SWELLING: Primary | ICD-10-CM

## 2020-01-01 DIAGNOSIS — K74.60 HEPATIC CIRRHOSIS, UNSPECIFIED HEPATIC CIRRHOSIS TYPE, UNSPECIFIED WHETHER ASCITES PRESENT (HCC): ICD-10-CM

## 2020-01-01 DIAGNOSIS — R10.9 ABDOMINAL PAIN, UNSPECIFIED ABDOMINAL LOCATION: ICD-10-CM

## 2020-01-01 DIAGNOSIS — E87.1 HYPONATREMIA: ICD-10-CM

## 2020-01-01 DIAGNOSIS — I95.9 HYPOTENSION, UNSPECIFIED HYPOTENSION TYPE: ICD-10-CM

## 2020-01-01 DIAGNOSIS — R79.89 LFT ELEVATION: ICD-10-CM

## 2020-01-01 LAB
ABO/RH: NORMAL
ALBUMIN SERPL-MCNC: 1.4 GM/DL (ref 3.4–5)
ALBUMIN SERPL-MCNC: 1.9 GM/DL (ref 3.4–5)
ALBUMIN SERPL-MCNC: 2 GM/DL (ref 3.4–5)
ALBUMIN SERPL-MCNC: 2.8 GM/DL (ref 3.4–5)
ALP BLD-CCNC: 256 IU/L (ref 40–129)
ALP BLD-CCNC: 265 IU/L (ref 40–129)
ALP BLD-CCNC: 266 IU/L (ref 40–128)
ALP BLD-CCNC: 387 IU/L (ref 40–129)
ALT SERPL-CCNC: 256 U/L (ref 10–40)
ALT SERPL-CCNC: 272 U/L (ref 10–40)
ALT SERPL-CCNC: 291 U/L (ref 10–40)
ALT SERPL-CCNC: 39 U/L (ref 10–40)
AMMONIA: 34 UMOL/L (ref 16–60)
AMMONIA: 38 UMOL/L (ref 16–60)
AMMONIA: 68 UMOL/L (ref 16–60)
AMPHETAMINES: NEGATIVE
AMYLASE: 121 U/L (ref 25–115)
ANION GAP SERPL CALCULATED.3IONS-SCNC: 11 MMOL/L (ref 4–16)
ANION GAP SERPL CALCULATED.3IONS-SCNC: 9 MMOL/L (ref 4–16)
ANISOCYTOSIS: ABNORMAL
ANTIBODY SCREEN: NEGATIVE
APTT: 54.1 SECONDS (ref 25.1–37.1)
APTT: 67.6 SECONDS (ref 25.1–37.1)
AST SERPL-CCNC: 623 IU/L (ref 15–37)
AST SERPL-CCNC: 626 IU/L (ref 15–37)
AST SERPL-CCNC: 80 IU/L (ref 15–37)
AST SERPL-CCNC: 805 IU/L (ref 15–37)
BACTERIA: ABNORMAL /HPF
BANDED NEUTROPHILS ABSOLUTE COUNT: 2.94 K/CU MM
BANDED NEUTROPHILS RELATIVE PERCENT: 28 % (ref 5–11)
BARBITURATE SCREEN URINE: NEGATIVE
BASOPHILS ABSOLUTE: 0 K/CU MM
BASOPHILS ABSOLUTE: 0 K/CU MM
BASOPHILS RELATIVE PERCENT: 0.2 % (ref 0–1)
BASOPHILS RELATIVE PERCENT: 0.4 % (ref 0–1)
BENZODIAZEPINE SCREEN, URINE: NEGATIVE
BILIRUB SERPL-MCNC: 13.5 MG/DL (ref 0–1)
BILIRUB SERPL-MCNC: 13.5 MG/DL (ref 0–1)
BILIRUB SERPL-MCNC: 14.8 MG/DL (ref 0–1)
BILIRUB SERPL-MCNC: 16.1 MG/DL (ref 0–1)
BILIRUB SERPL-MCNC: 2.9 MG/DL (ref 0–1)
BILIRUB SERPL-MCNC: 20.7 MG/DL (ref 0–1)
BILIRUBIN DIRECT: 11.2 MG/DL (ref 0–0.3)
BILIRUBIN DIRECT: 9.8 MG/DL (ref 0–0.3)
BILIRUBIN URINE: ABNORMAL MG/DL
BILIRUBIN, INDIRECT: 3.7 MG/DL (ref 0–0.7)
BILIRUBIN, INDIRECT: 4.9 MG/DL (ref 0–0.7)
BLOOD, URINE: ABNORMAL
BUN BLDV-MCNC: 18 MG/DL (ref 6–23)
BUN BLDV-MCNC: 35 MG/DL (ref 6–23)
BUN BLDV-MCNC: 39 MG/DL (ref 6–23)
BUN BLDV-MCNC: 45 MG/DL (ref 6–23)
BUN BLDV-MCNC: 51 MG/DL (ref 6–23)
BUN BLDV-MCNC: 6 MG/DL (ref 6–23)
BUN BLDV-MCNC: 63 MG/DL (ref 6–23)
CALCIUM SERPL-MCNC: 6.7 MG/DL (ref 8.3–10.6)
CALCIUM SERPL-MCNC: 7.1 MG/DL (ref 8.3–10.6)
CALCIUM SERPL-MCNC: 7.2 MG/DL (ref 8.3–10.6)
CALCIUM SERPL-MCNC: 7.3 MG/DL (ref 8.3–10.6)
CALCIUM SERPL-MCNC: 7.3 MG/DL (ref 8.3–10.6)
CALCIUM SERPL-MCNC: 7.8 MG/DL (ref 8.3–10.6)
CALCIUM SERPL-MCNC: 8.3 MG/DL (ref 8.3–10.6)
CANNABINOID SCREEN URINE: NEGATIVE
CELLULAR CASTS: 7 /LPF
CHLORIDE BLD-SCNC: 101 MMOL/L (ref 99–110)
CHLORIDE BLD-SCNC: 81 MMOL/L (ref 99–110)
CHLORIDE BLD-SCNC: 81 MMOL/L (ref 99–110)
CHLORIDE BLD-SCNC: 82 MMOL/L (ref 99–110)
CHLORIDE BLD-SCNC: 85 MMOL/L (ref 99–110)
CHLORIDE URINE RANDOM: 10 MMOL/L (ref 43–210)
CLARITY: ABNORMAL
CO2: 19 MMOL/L (ref 21–32)
CO2: 19 MMOL/L (ref 21–32)
CO2: 20 MMOL/L (ref 21–32)
CO2: 26 MMOL/L (ref 21–32)
CO2: 27 MMOL/L (ref 21–32)
COCAINE METABOLITE: NEGATIVE
COLOR: ABNORMAL
COMMENT: NORMAL
CREAT SERPL-MCNC: 0.7 MG/DL (ref 0.9–1.3)
CREAT SERPL-MCNC: 1.1 MG/DL (ref 0.9–1.3)
CREAT SERPL-MCNC: 1.7 MG/DL (ref 0.9–1.3)
CREAT SERPL-MCNC: 1.9 MG/DL (ref 0.9–1.3)
CREAT SERPL-MCNC: 2 MG/DL (ref 0.9–1.3)
CREAT SERPL-MCNC: 2.2 MG/DL (ref 0.9–1.3)
CREAT SERPL-MCNC: 2.8 MG/DL (ref 0.9–1.3)
CREATININE URINE: 118.2 MG/DL
CREATININE URINE: 120.7 MG/DL (ref 39–259)
CULTURE: NORMAL
DIFFERENTIAL TYPE: ABNORMAL
EOSINOPHILS ABSOLUTE: 0 K/CU MM
EOSINOPHILS ABSOLUTE: 0.1 K/CU MM
EOSINOPHILS RELATIVE PERCENT: 0.2 % (ref 0–3)
EOSINOPHILS RELATIVE PERCENT: 2.1 % (ref 0–3)
FERRITIN: 1012 NG/ML (ref 30–400)
GFR AFRICAN AMERICAN: 27 ML/MIN/1.73M2
GFR AFRICAN AMERICAN: 36 ML/MIN/1.73M2
GFR AFRICAN AMERICAN: 41 ML/MIN/1.73M2
GFR AFRICAN AMERICAN: 43 ML/MIN/1.73M2
GFR AFRICAN AMERICAN: 49 ML/MIN/1.73M2
GFR AFRICAN AMERICAN: >60 ML/MIN/1.73M2
GFR AFRICAN AMERICAN: >60 ML/MIN/1.73M2
GFR NON-AFRICAN AMERICAN: 23 ML/MIN/1.73M2
GFR NON-AFRICAN AMERICAN: 30 ML/MIN/1.73M2
GFR NON-AFRICAN AMERICAN: 33 ML/MIN/1.73M2
GFR NON-AFRICAN AMERICAN: 36 ML/MIN/1.73M2
GFR NON-AFRICAN AMERICAN: 40 ML/MIN/1.73M2
GFR NON-AFRICAN AMERICAN: >60 ML/MIN/1.73M2
GFR NON-AFRICAN AMERICAN: >60 ML/MIN/1.73M2
GLUCOSE BLD-MCNC: 102 MG/DL (ref 70–99)
GLUCOSE BLD-MCNC: 115 MG/DL (ref 70–99)
GLUCOSE BLD-MCNC: 122 MG/DL (ref 70–99)
GLUCOSE BLD-MCNC: 128 MG/DL (ref 70–99)
GLUCOSE BLD-MCNC: 164 MG/DL (ref 70–99)
GLUCOSE BLD-MCNC: 167 MG/DL (ref 70–99)
GLUCOSE BLD-MCNC: 180 MG/DL (ref 70–99)
GLUCOSE BLD-MCNC: 59 MG/DL (ref 70–99)
GLUCOSE BLD-MCNC: 60 MG/DL (ref 70–99)
GLUCOSE BLD-MCNC: 86 MG/DL (ref 70–99)
GLUCOSE BLD-MCNC: 89 MG/DL
GLUCOSE BLD-MCNC: 89 MG/DL (ref 70–99)
GLUCOSE, URINE: NEGATIVE MG/DL
GRANULAR CASTS: 2 /LPF
HBV SURFACE AB TITR SER: 98.66 {TITER}
HCT VFR BLD CALC: 32.1 % (ref 42–52)
HCT VFR BLD CALC: 34.1 % (ref 42–52)
HCT VFR BLD CALC: 35 % (ref 42–52)
HCT VFR BLD CALC: 37.4 % (ref 42–52)
HEMOGLOBIN: 11.8 GM/DL (ref 13.5–18)
HEMOGLOBIN: 12.5 GM/DL (ref 13.5–18)
HEMOGLOBIN: 13.3 GM/DL (ref 13.5–18)
HEMOGLOBIN: 13.4 GM/DL (ref 13.5–18)
HEPATITIS B SURFACE ANTIGEN: NON REACTIVE
ICTOTEST: POSITIVE
IMMATURE NEUTROPHIL %: 0.6 % (ref 0–0.43)
INR BLD: 2.85 INDEX
INR BLD: 3.99 INDEX
INR BLD: 5.34 INDEX
IRON: 78 UG/DL (ref 59–158)
IRON: 98 UG/DL (ref 59–158)
KETONES, URINE: NEGATIVE MG/DL
LACTATE: 3.2 MMOL/L (ref 0.4–2)
LACTATE: 4 MMOL/L (ref 0.4–2)
LACTATE: 5.2 MMOL/L (ref 0.4–2)
LEUKOCYTE ESTERASE, URINE: NEGATIVE
LIPASE: 268 IU/L (ref 13–60)
LIPASE: 40 IU/L (ref 13–60)
LYMPHOCYTES ABSOLUTE: 1.2 K/CU MM
LYMPHOCYTES ABSOLUTE: 1.3 K/CU MM
LYMPHOCYTES ABSOLUTE: 1.9 K/CU MM
LYMPHOCYTES RELATIVE PERCENT: 11 % (ref 24–44)
LYMPHOCYTES RELATIVE PERCENT: 12.5 % (ref 24–44)
LYMPHOCYTES RELATIVE PERCENT: 39 % (ref 24–44)
Lab: NORMAL
MACROCYTES: ABNORMAL
MAGNESIUM: 2.4 MG/DL (ref 1.8–2.4)
MCH RBC QN AUTO: 36.9 PG (ref 27–31)
MCH RBC QN AUTO: 37.3 PG (ref 27–31)
MCH RBC QN AUTO: 37.5 PG (ref 27–31)
MCH RBC QN AUTO: 37.9 PG (ref 27–31)
MCHC RBC AUTO-ENTMCNC: 35.6 % (ref 32–36)
MCHC RBC AUTO-ENTMCNC: 36.7 % (ref 32–36)
MCHC RBC AUTO-ENTMCNC: 36.8 % (ref 32–36)
MCHC RBC AUTO-ENTMCNC: 38.3 % (ref 32–36)
MCV RBC AUTO: 101.8 FL (ref 78–100)
MCV RBC AUTO: 101.9 FL (ref 78–100)
MCV RBC AUTO: 103.9 FL (ref 78–100)
MCV RBC AUTO: 98.9 FL (ref 78–100)
MONOCYTES ABSOLUTE: 0.3 K/CU MM
MONOCYTES ABSOLUTE: 0.7 K/CU MM
MONOCYTES ABSOLUTE: 1.2 K/CU MM
MONOCYTES RELATIVE PERCENT: 12.2 % (ref 0–4)
MONOCYTES RELATIVE PERCENT: 14.4 % (ref 0–4)
MONOCYTES RELATIVE PERCENT: 3 % (ref 0–4)
MS ALPHA-FETOPROTEIN: 2 NG/ML (ref 0–9)
MUCUS: ABNORMAL HPF
NITRITE URINE, QUANTITATIVE: NEGATIVE
NUCLEATED RBC %: 0 %
OPIATES, URINE: NEGATIVE
OSMOLALITY URINE: 355 MOS/L (ref 292–1090)
OXYCODONE: NEGATIVE
PCT TRANSFERRIN: 66 % (ref 10–44)
PCT TRANSFERRIN: 85 % (ref 10–44)
PDW BLD-RTO: 16.7 % (ref 11.7–14.9)
PDW BLD-RTO: 17.5 % (ref 11.7–14.9)
PDW BLD-RTO: 17.8 % (ref 11.7–14.9)
PDW BLD-RTO: 18.4 % (ref 11.7–14.9)
PH, URINE: 5 (ref 5–8)
PHENCYCLIDINE, URINE: NEGATIVE
PHOSPHORUS: 5.3 MG/DL (ref 2.5–4.9)
PLATELET # BLD: 65 K/CU MM (ref 140–440)
PLATELET # BLD: 97 K/CU MM (ref 140–440)
PLATELET # BLD: 98 K/CU MM (ref 140–440)
PLATELET # BLD: 98 K/CU MM (ref 140–440)
PLT MORPHOLOGY: ABNORMAL
PMV BLD AUTO: 11.8 FL (ref 7.5–11.1)
PMV BLD AUTO: 12.1 FL (ref 7.5–11.1)
PMV BLD AUTO: 12.2 FL (ref 7.5–11.1)
PMV BLD AUTO: 12.4 FL (ref 7.5–11.1)
POLYCHROMASIA: ABNORMAL
POTASSIUM SERPL-SCNC: 3.5 MMOL/L (ref 3.5–5.1)
POTASSIUM SERPL-SCNC: 3.8 MMOL/L (ref 3.5–5.1)
POTASSIUM SERPL-SCNC: 4.4 MMOL/L (ref 3.5–5.1)
POTASSIUM SERPL-SCNC: 4.4 MMOL/L (ref 3.5–5.1)
POTASSIUM SERPL-SCNC: 4.5 MMOL/L (ref 3.5–5.1)
POTASSIUM SERPL-SCNC: 4.5 MMOL/L (ref 3.5–5.1)
POTASSIUM SERPL-SCNC: 4.9 MMOL/L (ref 3.5–5.1)
POTASSIUM, UR: 38.6 MMOL/L (ref 22–119)
PRO-BNP: 574.6 PG/ML
PROT/CREAT RATIO, UR: 0.4
PROTEIN UA: NEGATIVE MG/DL
PROTHROMBIN TIME: 34.9 SECONDS (ref 11.7–14.5)
PROTHROMBIN TIME: 48.9 SECONDS (ref 11.7–14.5)
PROTHROMBIN TIME: 65.7 SECONDS (ref 11.7–14.5)
RBC # BLD: 3.15 M/CU MM (ref 4.6–6.2)
RBC # BLD: 3.35 M/CU MM (ref 4.6–6.2)
RBC # BLD: 3.54 M/CU MM (ref 4.6–6.2)
RBC # BLD: 3.6 M/CU MM (ref 4.6–6.2)
RBC URINE: ABNORMAL /HPF (ref 0–3)
REASON FOR REJECTION: NORMAL
REJECTED TEST: NORMAL
SARS-COV-2, NAAT: NOT DETECTED
SEGMENTED NEUTROPHILS ABSOLUTE COUNT: 2.1 K/CU MM
SEGMENTED NEUTROPHILS ABSOLUTE COUNT: 6.1 K/CU MM
SEGMENTED NEUTROPHILS ABSOLUTE COUNT: 7.5 K/CU MM
SEGMENTED NEUTROPHILS RELATIVE PERCENT: 44.1 % (ref 36–66)
SEGMENTED NEUTROPHILS RELATIVE PERCENT: 58 % (ref 36–66)
SEGMENTED NEUTROPHILS RELATIVE PERCENT: 74.3 % (ref 36–66)
SODIUM BLD-SCNC: 111 MMOL/L (ref 135–145)
SODIUM BLD-SCNC: 112 MMOL/L (ref 135–145)
SODIUM BLD-SCNC: 113 MMOL/L (ref 135–145)
SODIUM BLD-SCNC: 116 MMOL/L (ref 135–145)
SODIUM BLD-SCNC: 118 MMOL/L (ref 135–145)
SODIUM BLD-SCNC: 137 MMOL/L (ref 135–145)
SODIUM URINE: 10 MMOL/L (ref 35–167)
SODIUM URINE: 10 MMOL/L (ref 35–167)
SOURCE: NORMAL
SPECIFIC GRAVITY UA: 1.02 (ref 1–1.03)
SPECIMEN: NORMAL
TARGET CELLS: ABNORMAL
TOTAL CK: 389 IU/L (ref 38–174)
TOTAL IMMATURE NEUTOROPHIL: 0.06 K/CU MM
TOTAL IRON BINDING CAPACITY: 149 UG/DL (ref 250–450)
TOTAL IRON BINDING CAPACITY: 92 UG/DL (ref 250–450)
TOTAL NUCLEATED RBC: 0 K/CU MM
TOTAL PROTEIN: 6.6 GM/DL (ref 6.4–8.2)
TOTAL PROTEIN: 6.8 GM/DL (ref 6.4–8.2)
TOTAL PROTEIN: 7.9 GM/DL (ref 6.4–8.2)
TOTAL PROTEIN: 8.5 GM/DL (ref 6.4–8.2)
TOXIC GRANULATION: PRESENT
TRICHOMONAS: ABNORMAL /HPF
TSH HIGH SENSITIVITY: 1.17 UIU/ML (ref 0.27–4.2)
UNSATURATED IRON BINDING CAPACITY: 14 UG/DL (ref 110–370)
UNSATURATED IRON BINDING CAPACITY: 51 UG/DL (ref 110–370)
URINE TOTAL PROTEIN: 50.3 MG/DL
UROBILINOGEN, URINE: 2 MG/DL (ref 0.2–1)
WBC # BLD: 10 K/CU MM (ref 4–10.5)
WBC # BLD: 10.5 K/CU MM (ref 4–10.5)
WBC # BLD: 10.6 K/CU MM (ref 4–10.5)
WBC # BLD: 4.8 K/CU MM (ref 4–10.5)
WBC UA: ABNORMAL /HPF (ref 0–2)

## 2020-01-01 PROCEDURE — 82728 ASSAY OF FERRITIN: CPT

## 2020-01-01 PROCEDURE — G8417 CALC BMI ABV UP PARAM F/U: HCPCS | Performed by: FAMILY MEDICINE

## 2020-01-01 PROCEDURE — 82150 ASSAY OF AMYLASE: CPT

## 2020-01-01 PROCEDURE — 84443 ASSAY THYROID STIM HORMONE: CPT

## 2020-01-01 PROCEDURE — 2500000003 HC RX 250 WO HCPCS: Performed by: NURSE PRACTITIONER

## 2020-01-01 PROCEDURE — 2580000003 HC RX 258

## 2020-01-01 PROCEDURE — 2580000003 HC RX 258: Performed by: INTERNAL MEDICINE

## 2020-01-01 PROCEDURE — 80048 BASIC METABOLIC PNL TOTAL CA: CPT

## 2020-01-01 PROCEDURE — 83605 ASSAY OF LACTIC ACID: CPT

## 2020-01-01 PROCEDURE — 99285 EMERGENCY DEPT VISIT HI MDM: CPT

## 2020-01-01 PROCEDURE — 36415 COLL VENOUS BLD VENIPUNCTURE: CPT

## 2020-01-01 PROCEDURE — 6360000002 HC RX W HCPCS: Performed by: SPECIALIST

## 2020-01-01 PROCEDURE — 85027 COMPLETE CBC AUTOMATED: CPT

## 2020-01-01 PROCEDURE — C1752 CATH,HEMODIALYSIS,SHORT-TERM: HCPCS

## 2020-01-01 PROCEDURE — 83540 ASSAY OF IRON: CPT

## 2020-01-01 PROCEDURE — 84300 ASSAY OF URINE SODIUM: CPT

## 2020-01-01 PROCEDURE — 81001 URINALYSIS AUTO W/SCOPE: CPT

## 2020-01-01 PROCEDURE — 93010 ELECTROCARDIOGRAM REPORT: CPT | Performed by: INTERNAL MEDICINE

## 2020-01-01 PROCEDURE — 82248 BILIRUBIN DIRECT: CPT

## 2020-01-01 PROCEDURE — 2500000003 HC RX 250 WO HCPCS: Performed by: INTERNAL MEDICINE

## 2020-01-01 PROCEDURE — G8427 DOCREV CUR MEDS BY ELIG CLIN: HCPCS | Performed by: FAMILY MEDICINE

## 2020-01-01 PROCEDURE — 96365 THER/PROPH/DIAG IV INF INIT: CPT

## 2020-01-01 PROCEDURE — 82247 BILIRUBIN TOTAL: CPT

## 2020-01-01 PROCEDURE — 3017F COLORECTAL CA SCREEN DOC REV: CPT | Performed by: FAMILY MEDICINE

## 2020-01-01 PROCEDURE — 84100 ASSAY OF PHOSPHORUS: CPT

## 2020-01-01 PROCEDURE — 86850 RBC ANTIBODY SCREEN: CPT

## 2020-01-01 PROCEDURE — 86704 HEP B CORE ANTIBODY TOTAL: CPT

## 2020-01-01 PROCEDURE — 94761 N-INVAS EAR/PLS OXIMETRY MLT: CPT

## 2020-01-01 PROCEDURE — 85610 PROTHROMBIN TIME: CPT

## 2020-01-01 PROCEDURE — 2580000003 HC RX 258: Performed by: EMERGENCY MEDICINE

## 2020-01-01 PROCEDURE — 2000000000 HC ICU R&B

## 2020-01-01 PROCEDURE — 2580000003 HC RX 258: Performed by: FAMILY MEDICINE

## 2020-01-01 PROCEDURE — 93005 ELECTROCARDIOGRAM TRACING: CPT | Performed by: EMERGENCY MEDICINE

## 2020-01-01 PROCEDURE — 80053 COMPREHEN METABOLIC PANEL: CPT

## 2020-01-01 PROCEDURE — 99223 1ST HOSP IP/OBS HIGH 75: CPT | Performed by: INTERNAL MEDICINE

## 2020-01-01 PROCEDURE — 6370000000 HC RX 637 (ALT 250 FOR IP): Performed by: INTERNAL MEDICINE

## 2020-01-01 PROCEDURE — 36592 COLLECT BLOOD FROM PICC: CPT

## 2020-01-01 PROCEDURE — 4004F PT TOBACCO SCREEN RCVD TLK: CPT | Performed by: FAMILY MEDICINE

## 2020-01-01 PROCEDURE — 36569 INSJ PICC 5 YR+ W/O IMAGING: CPT

## 2020-01-01 PROCEDURE — 84295 ASSAY OF SERUM SODIUM: CPT

## 2020-01-01 PROCEDURE — 85730 THROMBOPLASTIN TIME PARTIAL: CPT

## 2020-01-01 PROCEDURE — 6360000002 HC RX W HCPCS: Performed by: INTERNAL MEDICINE

## 2020-01-01 PROCEDURE — G8427 DOCREV CUR MEDS BY ELIG CLIN: HCPCS | Performed by: INTERNAL MEDICINE

## 2020-01-01 PROCEDURE — 83690 ASSAY OF LIPASE: CPT

## 2020-01-01 PROCEDURE — C1751 CATH, INF, PER/CENT/MIDLINE: HCPCS

## 2020-01-01 PROCEDURE — 6360000002 HC RX W HCPCS: Performed by: EMERGENCY MEDICINE

## 2020-01-01 PROCEDURE — 85025 COMPLETE CBC W/AUTO DIFF WBC: CPT

## 2020-01-01 PROCEDURE — 4040F PNEUMOC VAC/ADMIN/RCVD: CPT | Performed by: FAMILY MEDICINE

## 2020-01-01 PROCEDURE — 74176 CT ABD & PELVIS W/O CONTRAST: CPT

## 2020-01-01 PROCEDURE — 02HV33Z INSERTION OF INFUSION DEVICE INTO SUPERIOR VENA CAVA, PERCUTANEOUS APPROACH: ICD-10-PCS | Performed by: INTERNAL MEDICINE

## 2020-01-01 PROCEDURE — 83935 ASSAY OF URINE OSMOLALITY: CPT

## 2020-01-01 PROCEDURE — 3017F COLORECTAL CA SCREEN DOC REV: CPT | Performed by: INTERNAL MEDICINE

## 2020-01-01 PROCEDURE — 83550 IRON BINDING TEST: CPT

## 2020-01-01 PROCEDURE — 82962 GLUCOSE BLOOD TEST: CPT

## 2020-01-01 PROCEDURE — 84156 ASSAY OF PROTEIN URINE: CPT

## 2020-01-01 PROCEDURE — U0002 COVID-19 LAB TEST NON-CDC: HCPCS

## 2020-01-01 PROCEDURE — 6370000000 HC RX 637 (ALT 250 FOR IP): Performed by: NURSE PRACTITIONER

## 2020-01-01 PROCEDURE — 96361 HYDRATE IV INFUSION ADD-ON: CPT

## 2020-01-01 PROCEDURE — 87150 DNA/RNA AMPLIFIED PROBE: CPT

## 2020-01-01 PROCEDURE — 87040 BLOOD CULTURE FOR BACTERIA: CPT

## 2020-01-01 PROCEDURE — 87340 HEPATITIS B SURFACE AG IA: CPT

## 2020-01-01 PROCEDURE — 96375 TX/PRO/DX INJ NEW DRUG ADDON: CPT

## 2020-01-01 PROCEDURE — 6360000004 HC RX CONTRAST MEDICATION: Performed by: FAMILY MEDICINE

## 2020-01-01 PROCEDURE — 80307 DRUG TEST PRSMV CHEM ANLYZR: CPT

## 2020-01-01 PROCEDURE — 4004F PT TOBACCO SCREEN RCVD TLK: CPT | Performed by: INTERNAL MEDICINE

## 2020-01-01 PROCEDURE — 82105 ALPHA-FETOPROTEIN SERUM: CPT

## 2020-01-01 PROCEDURE — G8484 FLU IMMUNIZE NO ADMIN: HCPCS | Performed by: FAMILY MEDICINE

## 2020-01-01 PROCEDURE — 51798 US URINE CAPACITY MEASURE: CPT

## 2020-01-01 PROCEDURE — 83735 ASSAY OF MAGNESIUM: CPT

## 2020-01-01 PROCEDURE — 99213 OFFICE O/P EST LOW 20 MIN: CPT | Performed by: INTERNAL MEDICINE

## 2020-01-01 PROCEDURE — 82140 ASSAY OF AMMONIA: CPT

## 2020-01-01 PROCEDURE — 87186 SC STD MICRODIL/AGAR DIL: CPT

## 2020-01-01 PROCEDURE — 83880 ASSAY OF NATRIURETIC PEPTIDE: CPT

## 2020-01-01 PROCEDURE — C9113 INJ PANTOPRAZOLE SODIUM, VIA: HCPCS | Performed by: SPECIALIST

## 2020-01-01 PROCEDURE — 86706 HEP B SURFACE ANTIBODY: CPT

## 2020-01-01 PROCEDURE — 71045 X-RAY EXAM CHEST 1 VIEW: CPT

## 2020-01-01 PROCEDURE — 2580000003 HC RX 258: Performed by: NURSE PRACTITIONER

## 2020-01-01 PROCEDURE — P9017 PLASMA 1 DONOR FRZ W/IN 8 HR: HCPCS

## 2020-01-01 PROCEDURE — 2700000000 HC OXYGEN THERAPY PER DAY

## 2020-01-01 PROCEDURE — 87086 URINE CULTURE/COLONY COUNT: CPT

## 2020-01-01 PROCEDURE — P9047 ALBUMIN (HUMAN), 25%, 50ML: HCPCS | Performed by: INTERNAL MEDICINE

## 2020-01-01 PROCEDURE — 86900 BLOOD TYPING SEROLOGIC ABO: CPT

## 2020-01-01 PROCEDURE — 76937 US GUIDE VASCULAR ACCESS: CPT

## 2020-01-01 PROCEDURE — 76775 US EXAM ABDO BACK WALL LIM: CPT

## 2020-01-01 PROCEDURE — 96360 HYDRATION IV INFUSION INIT: CPT

## 2020-01-01 PROCEDURE — 82550 ASSAY OF CK (CPK): CPT

## 2020-01-01 PROCEDURE — 2709999900 HC NON-CHARGEABLE SUPPLY

## 2020-01-01 PROCEDURE — 4040F PNEUMOC VAC/ADMIN/RCVD: CPT | Performed by: INTERNAL MEDICINE

## 2020-01-01 PROCEDURE — 86901 BLOOD TYPING SEROLOGIC RH(D): CPT

## 2020-01-01 PROCEDURE — 93970 EXTREMITY STUDY: CPT

## 2020-01-01 PROCEDURE — 84133 ASSAY OF URINE POTASSIUM: CPT

## 2020-01-01 PROCEDURE — 1123F ACP DISCUSS/DSCN MKR DOCD: CPT | Performed by: FAMILY MEDICINE

## 2020-01-01 PROCEDURE — 85007 BL SMEAR W/DIFF WBC COUNT: CPT

## 2020-01-01 PROCEDURE — 70491 CT SOFT TISSUE NECK W/DYE: CPT

## 2020-01-01 PROCEDURE — 99213 OFFICE O/P EST LOW 20 MIN: CPT | Performed by: FAMILY MEDICINE

## 2020-01-01 PROCEDURE — G8417 CALC BMI ABV UP PARAM F/U: HCPCS | Performed by: INTERNAL MEDICINE

## 2020-01-01 PROCEDURE — 1123F ACP DISCUSS/DSCN MKR DOCD: CPT | Performed by: INTERNAL MEDICINE

## 2020-01-01 PROCEDURE — 82436 ASSAY OF URINE CHLORIDE: CPT

## 2020-01-01 PROCEDURE — 82570 ASSAY OF URINE CREATININE: CPT

## 2020-01-01 RX ORDER — DEXTROSE MONOHYDRATE 25 G/50ML
INJECTION, SOLUTION INTRAVENOUS
Status: COMPLETED
Start: 2020-01-01 | End: 2020-01-01

## 2020-01-01 RX ORDER — ONDANSETRON 2 MG/ML
4 INJECTION INTRAMUSCULAR; INTRAVENOUS EVERY 6 HOURS PRN
Status: DISCONTINUED | OUTPATIENT
Start: 2020-01-01 | End: 2020-01-01 | Stop reason: HOSPADM

## 2020-01-01 RX ORDER — PANTOPRAZOLE SODIUM 40 MG/10ML
40 INJECTION, POWDER, LYOPHILIZED, FOR SOLUTION INTRAVENOUS DAILY
Status: DISCONTINUED | OUTPATIENT
Start: 2020-01-01 | End: 2020-01-01 | Stop reason: HOSPADM

## 2020-01-01 RX ORDER — SODIUM CHLORIDE 0.9 % (FLUSH) 0.9 %
10 SYRINGE (ML) INJECTION PRN
Status: DISCONTINUED | OUTPATIENT
Start: 2020-01-01 | End: 2020-01-01 | Stop reason: HOSPADM

## 2020-01-01 RX ORDER — ALBUMIN (HUMAN) 12.5 G/50ML
25 SOLUTION INTRAVENOUS ONCE
Status: COMPLETED | OUTPATIENT
Start: 2020-01-01 | End: 2020-01-01

## 2020-01-01 RX ORDER — FOLIC ACID 1 MG/1
1 TABLET ORAL DAILY
Status: DISCONTINUED | OUTPATIENT
Start: 2020-01-01 | End: 2020-01-01 | Stop reason: HOSPADM

## 2020-01-01 RX ORDER — LORAZEPAM 2 MG/ML
3 INJECTION INTRAMUSCULAR
Status: DISCONTINUED | OUTPATIENT
Start: 2020-01-01 | End: 2020-01-01 | Stop reason: HOSPADM

## 2020-01-01 RX ORDER — POTASSIUM CHLORIDE 750 MG/1
TABLET, FILM COATED, EXTENDED RELEASE ORAL
Qty: 30 TABLET | Refills: 5 | Status: SHIPPED | OUTPATIENT
Start: 2020-01-01

## 2020-01-01 RX ORDER — SODIUM CHLORIDE 9 MG/ML
INJECTION, SOLUTION INTRAVENOUS CONTINUOUS
Status: DISCONTINUED | OUTPATIENT
Start: 2020-01-01 | End: 2020-01-01

## 2020-01-01 RX ORDER — PROMETHAZINE HYDROCHLORIDE 25 MG/1
12.5 TABLET ORAL EVERY 6 HOURS PRN
Status: DISCONTINUED | OUTPATIENT
Start: 2020-01-01 | End: 2020-01-01 | Stop reason: HOSPADM

## 2020-01-01 RX ORDER — POLYETHYLENE GLYCOL 3350 17 G/17G
17 POWDER, FOR SOLUTION ORAL DAILY PRN
Status: DISCONTINUED | OUTPATIENT
Start: 2020-01-01 | End: 2020-01-01 | Stop reason: HOSPADM

## 2020-01-01 RX ORDER — LORAZEPAM 1 MG/1
3 TABLET ORAL
Status: DISCONTINUED | OUTPATIENT
Start: 2020-01-01 | End: 2020-01-01 | Stop reason: HOSPADM

## 2020-01-01 RX ORDER — 0.9 % SODIUM CHLORIDE 0.9 %
1000 INTRAVENOUS SOLUTION INTRAVENOUS ONCE
Status: DISCONTINUED | OUTPATIENT
Start: 2020-01-01 | End: 2020-01-01

## 2020-01-01 RX ORDER — LORAZEPAM 1 MG/1
4 TABLET ORAL
Status: DISCONTINUED | OUTPATIENT
Start: 2020-01-01 | End: 2020-01-01 | Stop reason: HOSPADM

## 2020-01-01 RX ORDER — PHYTONADIONE 5 MG/1
5 TABLET ORAL ONCE
Status: COMPLETED | OUTPATIENT
Start: 2020-01-01 | End: 2020-01-01

## 2020-01-01 RX ORDER — DEXTROSE MONOHYDRATE 25 G/50ML
INJECTION, SOLUTION INTRAVENOUS
Status: DISPENSED
Start: 2020-01-01 | End: 2020-01-01

## 2020-01-01 RX ORDER — SODIUM CHLORIDE 0.9 % (FLUSH) 0.9 %
10 SYRINGE (ML) INJECTION EVERY 12 HOURS SCHEDULED
Status: DISCONTINUED | OUTPATIENT
Start: 2020-01-01 | End: 2020-01-01 | Stop reason: HOSPADM

## 2020-01-01 RX ORDER — ACETAMINOPHEN 650 MG/1
650 SUPPOSITORY RECTAL EVERY 6 HOURS PRN
Status: DISCONTINUED | OUTPATIENT
Start: 2020-01-01 | End: 2020-01-01 | Stop reason: HOSPADM

## 2020-01-01 RX ORDER — LANOLIN ALCOHOL/MO/W.PET/CERES
100 CREAM (GRAM) TOPICAL DAILY
Status: DISCONTINUED | OUTPATIENT
Start: 2020-01-01 | End: 2020-01-01 | Stop reason: HOSPADM

## 2020-01-01 RX ORDER — SPIRONOLACTONE 50 MG/1
50 TABLET, FILM COATED ORAL DAILY
Qty: 30 TABLET | Refills: 5 | Status: SHIPPED | OUTPATIENT
Start: 2020-01-01

## 2020-01-01 RX ORDER — LACTULOSE 10 G/15ML
20 SOLUTION ORAL 3 TIMES DAILY
Status: DISCONTINUED | OUTPATIENT
Start: 2020-01-01 | End: 2020-01-01 | Stop reason: HOSPADM

## 2020-01-01 RX ORDER — ACETAMINOPHEN 325 MG/1
650 TABLET ORAL EVERY 6 HOURS PRN
Status: DISCONTINUED | OUTPATIENT
Start: 2020-01-01 | End: 2020-01-01 | Stop reason: HOSPADM

## 2020-01-01 RX ORDER — DEMECLOCYCLINE HYDROCHLORIDE 150 MG/1
300 TABLET, FILM COATED ORAL EVERY 12 HOURS SCHEDULED
Status: DISCONTINUED | OUTPATIENT
Start: 2020-01-01 | End: 2020-01-01 | Stop reason: HOSPADM

## 2020-01-01 RX ORDER — SPIRONOLACTONE 50 MG/1
50 TABLET, FILM COATED ORAL DAILY
Qty: 30 TABLET | Refills: 5 | Status: SHIPPED | OUTPATIENT
Start: 2020-01-01 | End: 2020-01-01

## 2020-01-01 RX ORDER — LORAZEPAM 1 MG/1
2 TABLET ORAL
Status: DISCONTINUED | OUTPATIENT
Start: 2020-01-01 | End: 2020-01-01 | Stop reason: HOSPADM

## 2020-01-01 RX ORDER — SODIUM CHLORIDE 9 MG/ML
INJECTION, SOLUTION INTRAVENOUS PRN
Status: DISCONTINUED | OUTPATIENT
Start: 2020-01-01 | End: 2020-01-01 | Stop reason: HOSPADM

## 2020-01-01 RX ORDER — POTASSIUM CHLORIDE 750 MG/1
10 TABLET, EXTENDED RELEASE ORAL DAILY
Qty: 30 TABLET | Refills: 5 | Status: SHIPPED | OUTPATIENT
Start: 2020-01-01 | End: 2020-01-01

## 2020-01-01 RX ORDER — 3% SODIUM CHLORIDE 3 G/100ML
15 INJECTION, SOLUTION INTRAVENOUS CONTINUOUS
Status: DISCONTINUED | OUTPATIENT
Start: 2020-01-01 | End: 2020-01-01 | Stop reason: HOSPADM

## 2020-01-01 RX ORDER — FUROSEMIDE 40 MG/1
40 TABLET ORAL DAILY
Qty: 30 TABLET | Refills: 5 | Status: SHIPPED | OUTPATIENT
Start: 2020-01-01 | End: 2020-01-01

## 2020-01-01 RX ORDER — 0.9 % SODIUM CHLORIDE 0.9 %
1000 INTRAVENOUS SOLUTION INTRAVENOUS ONCE
Status: COMPLETED | OUTPATIENT
Start: 2020-01-01 | End: 2020-01-01

## 2020-01-01 RX ORDER — DEXTROSE MONOHYDRATE 25 G/50ML
25 INJECTION, SOLUTION INTRAVENOUS ONCE
Status: COMPLETED | OUTPATIENT
Start: 2020-01-01 | End: 2020-01-01

## 2020-01-01 RX ORDER — FUROSEMIDE 40 MG/1
40 TABLET ORAL DAILY
Qty: 30 TABLET | Refills: 5 | Status: SHIPPED | OUTPATIENT
Start: 2020-01-01

## 2020-01-01 RX ORDER — MULTIVITAMIN WITH IRON
1 TABLET ORAL DAILY
Status: DISCONTINUED | OUTPATIENT
Start: 2020-01-01 | End: 2020-01-01 | Stop reason: HOSPADM

## 2020-01-01 RX ORDER — LORAZEPAM 2 MG/ML
1 INJECTION INTRAMUSCULAR
Status: DISCONTINUED | OUTPATIENT
Start: 2020-01-01 | End: 2020-01-01 | Stop reason: HOSPADM

## 2020-01-01 RX ORDER — FUROSEMIDE 10 MG/ML
40 INJECTION INTRAMUSCULAR; INTRAVENOUS ONCE
Status: COMPLETED | OUTPATIENT
Start: 2020-01-01 | End: 2020-01-01

## 2020-01-01 RX ORDER — LORAZEPAM 2 MG/ML
2 INJECTION INTRAMUSCULAR
Status: DISCONTINUED | OUTPATIENT
Start: 2020-01-01 | End: 2020-01-01 | Stop reason: HOSPADM

## 2020-01-01 RX ORDER — LORAZEPAM 1 MG/1
1 TABLET ORAL
Status: DISCONTINUED | OUTPATIENT
Start: 2020-01-01 | End: 2020-01-01 | Stop reason: HOSPADM

## 2020-01-01 RX ORDER — LORAZEPAM 2 MG/ML
4 INJECTION INTRAMUSCULAR
Status: DISCONTINUED | OUTPATIENT
Start: 2020-01-01 | End: 2020-01-01 | Stop reason: HOSPADM

## 2020-01-01 RX ORDER — SODIUM CHLORIDE 9 MG/ML
INJECTION, SOLUTION INTRAVENOUS CONTINUOUS
Status: DISCONTINUED | OUTPATIENT
Start: 2020-01-01 | End: 2020-01-01 | Stop reason: HOSPADM

## 2020-01-01 RX ADMIN — SODIUM CHLORIDE: 9 INJECTION, SOLUTION INTRAVENOUS at 16:15

## 2020-01-01 RX ADMIN — Medication 15 G: at 08:47

## 2020-01-01 RX ADMIN — FOLIC ACID 1 MG: 1 TABLET ORAL at 09:32

## 2020-01-01 RX ADMIN — PHYTONADIONE 5 MG: 5 TABLET ORAL at 08:47

## 2020-01-01 RX ADMIN — Medication 15 MCG/MIN: at 22:42

## 2020-01-01 RX ADMIN — FOLIC ACID 1 MG: 1 TABLET ORAL at 21:46

## 2020-01-01 RX ADMIN — THERA TABS 1 TABLET: TAB at 08:47

## 2020-01-01 RX ADMIN — SODIUM CHLORIDE: 9 INJECTION, SOLUTION INTRAVENOUS at 17:26

## 2020-01-01 RX ADMIN — PANTOPRAZOLE SODIUM 40 MG: 40 INJECTION, POWDER, FOR SOLUTION INTRAVENOUS at 13:07

## 2020-01-01 RX ADMIN — SODIUM CHLORIDE, PRESERVATIVE FREE 10 ML: 5 INJECTION INTRAVENOUS at 22:47

## 2020-01-01 RX ADMIN — FUROSEMIDE 40 MG: 10 INJECTION, SOLUTION INTRAMUSCULAR; INTRAVENOUS at 16:26

## 2020-01-01 RX ADMIN — CEFEPIME HYDROCHLORIDE 2 G: 2 INJECTION, POWDER, FOR SOLUTION INTRAVENOUS at 09:33

## 2020-01-01 RX ADMIN — CEFEPIME HYDROCHLORIDE 2 G: 2 INJECTION, POWDER, FOR SOLUTION INTRAVENOUS at 22:40

## 2020-01-01 RX ADMIN — THERA TABS 1 TABLET: TAB at 21:44

## 2020-01-01 RX ADMIN — DEMECLOCYCLINE 300 MG: 150 TABLET ORAL at 13:07

## 2020-01-01 RX ADMIN — DEXTROSE MONOHYDRATE 50 ML: 25 INJECTION, SOLUTION INTRAVENOUS at 07:42

## 2020-01-01 RX ADMIN — CEFEPIME HYDROCHLORIDE 2 G: 2 INJECTION, POWDER, FOR SOLUTION INTRAVENOUS at 14:25

## 2020-01-01 RX ADMIN — SODIUM CHLORIDE 1000 ML: 9 INJECTION, SOLUTION INTRAVENOUS at 13:02

## 2020-01-01 RX ADMIN — SODIUM CHLORIDE, PRESERVATIVE FREE 10 ML: 5 INJECTION INTRAVENOUS at 09:36

## 2020-01-01 RX ADMIN — DEMECLOCYCLINE 300 MG: 150 TABLET ORAL at 22:40

## 2020-01-01 RX ADMIN — Medication 100 MG: at 08:47

## 2020-01-01 RX ADMIN — Medication 100 MG: at 23:38

## 2020-01-01 RX ADMIN — SODIUM CHLORIDE 15 ML/HR: 3 INJECTION, SOLUTION INTRAVENOUS at 16:26

## 2020-01-01 RX ADMIN — IOPAMIDOL 75 ML: 755 INJECTION, SOLUTION INTRAVENOUS at 08:31

## 2020-01-01 RX ADMIN — SODIUM CHLORIDE, PRESERVATIVE FREE 10 ML: 5 INJECTION INTRAVENOUS at 09:04

## 2020-01-01 RX ADMIN — CEFEPIME HYDROCHLORIDE 2 G: 2 INJECTION, POWDER, FOR SOLUTION INTRAVENOUS at 08:47

## 2020-01-01 RX ADMIN — PANTOPRAZOLE SODIUM 40 MG: 40 INJECTION, POWDER, FOR SOLUTION INTRAVENOUS at 08:47

## 2020-01-01 RX ADMIN — SODIUM CHLORIDE: 9 INJECTION, SOLUTION INTRAVENOUS at 01:51

## 2020-01-01 RX ADMIN — Medication 2 MCG/MIN: at 19:36

## 2020-01-01 RX ADMIN — SODIUM CHLORIDE, PRESERVATIVE FREE 10 ML: 5 INJECTION INTRAVENOUS at 08:31

## 2020-01-01 RX ADMIN — VANCOMYCIN HYDROCHLORIDE 1750 MG: 5 INJECTION, POWDER, LYOPHILIZED, FOR SOLUTION INTRAVENOUS at 16:16

## 2020-01-01 RX ADMIN — Medication 100 MG: at 09:33

## 2020-01-01 RX ADMIN — FOLIC ACID 1 MG: 1 TABLET ORAL at 08:47

## 2020-01-01 RX ADMIN — SODIUM CHLORIDE, PRESERVATIVE FREE 10 ML: 5 INJECTION INTRAVENOUS at 22:48

## 2020-01-01 RX ADMIN — SODIUM CHLORIDE 1000 ML: 9 INJECTION, SOLUTION INTRAVENOUS at 13:01

## 2020-01-01 RX ADMIN — DEMECLOCYCLINE 300 MG: 150 TABLET ORAL at 09:31

## 2020-01-01 RX ADMIN — Medication 15 G: at 13:07

## 2020-01-01 RX ADMIN — DEXTROSE MONOHYDRATE 25 G: 25 INJECTION, SOLUTION INTRAVENOUS at 15:13

## 2020-01-01 RX ADMIN — Medication 30 MCG/MIN: at 11:57

## 2020-01-01 RX ADMIN — SODIUM CHLORIDE, PRESERVATIVE FREE 10 ML: 5 INJECTION INTRAVENOUS at 09:33

## 2020-01-01 RX ADMIN — ALBUMIN (HUMAN) 25 G: 0.25 INJECTION, SOLUTION INTRAVENOUS at 16:25

## 2020-01-01 RX ADMIN — THERA TABS 1 TABLET: TAB at 09:32

## 2020-01-01 ASSESSMENT — ENCOUNTER SYMPTOMS
COUGH: 0
COLOR CHANGE: 1
ALLERGIC/IMMUNOLOGIC NEGATIVE: 1
CHEST TIGHTNESS: 0
RESPIRATORY NEGATIVE: 1
ABDOMINAL PAIN: 1
DIARRHEA: 0
EYES NEGATIVE: 1
ABDOMINAL DISTENTION: 1
BACK PAIN: 0
CONSTIPATION: 1
NAUSEA: 1
SHORTNESS OF BREATH: 0
ABDOMINAL PAIN: 0
NAUSEA: 0
WHEEZING: 0

## 2020-01-01 ASSESSMENT — PAIN SCALES - GENERAL
PAINLEVEL_OUTOF10: 0

## 2020-01-01 ASSESSMENT — PATIENT HEALTH QUESTIONNAIRE - PHQ9
SUM OF ALL RESPONSES TO PHQ QUESTIONS 1-9: 0
2. FEELING DOWN, DEPRESSED OR HOPELESS: 0
SUM OF ALL RESPONSES TO PHQ QUESTIONS 1-9: 0
1. LITTLE INTEREST OR PLEASURE IN DOING THINGS: 0
SUM OF ALL RESPONSES TO PHQ9 QUESTIONS 1 & 2: 0

## 2020-09-23 NOTE — PROGRESS NOTES
Patient Name: Francis Pu  Patient : 1955  Patient MRN: [id number]     Primary Oncologist: Digna Cruz MD  Referring Provider: Danie Coreas MD     Date of Service: 2020      Chief Complaint:   Chief Complaint   Patient presents with    Follow-up     He came in for follow-up visit. Patient Active Problem List:     Alcoholic cirrhosis ([address]     Tobacco dependence     ETOH abuse     Biceps tendinitis on right     Refused Streptococcus pneumoniae vaccination     HPI:   Mr. Carlie Rinne is a pleasant 58-year-old  male patient with an underlying liver cirrhosis. alcohol abuse, history of Hepatitis B for evaluation of heterozygous H63D mutation. He admitted that he had drunk more alcohol after spouse's passing but lately he has been cutting it down. He drinks about 2 to 3 can of beer per day. At one time he was seen by Dr. Gosia Gomez. I discussed with him about low-dose CT chest screening for lung cancer since he has history more than 50-pack-year smoking history. He refuses at this time. He never had any colonoscopy but had stool tests. CBC in May 2014 revealed WBC 6.7, hemoglobin 16.2, hematocrit 46.2, , platelet 243. In 2015 WBC was 5.4, hemoglobin 14.6, .2, platelets 337. In May 2017 WBC was 4.4, hemoglobin 14.9, hematocrit 42.7, .4, blood rate 143. Folate 9.7, B12 664. In May 2019 WBC was 4.7, hemoglobin 14.9, hematocrit 44, platelet 97. Ferritin was 352, TIBC 230, transferrin saturation 97%. IgG was 2249. Alpha-fetoprotein was 7. Sodium 141, potassium 3.8, BUN 8, creatinine 0.8, calcium 8.5, total protein 7.2, albumin 3.3, alk phos 175, ALT 27, AST 57, total bilirubin 2.6. Alpha-1 antitrypsin was 75. Direct bilirubin was 1.0, indirect bili 1.6. TSH 2.22. INR was 1.23, PT 15.4. FERNIE was not detected, F-actin IgG was 20, elevated. Smooth muscle antibody was within normal limits.  Hepatitis B surface antibody was 229.3, he has positive B core antibody. Hepatitis C PCR study was not detected. Hemachromatosis genetic study showed H63D mutation. I discussed with him about alcohol cessation and his risk to develop iron overload by having heterozygous H63D mutation and alcoholism. We discussed about healthy diet. I will check his iron study and ferritin today. If ferritin continues to rise, we discussed about potential therapeutic phlebotomy. He had several therapeutic paracentesis. He is currently on Lasix, Aldactone and potassium supplement. He was referred to see hepatologist at [address] [place] [address] and refused to go. Labs in 2019 were reviewed. He will have 2 phlebotomies prior to next OV. We discussed about EtOH and he will try to cut down. He is on lasix and spironolactone. He had phlebotomy in 2019. On 2020 he came in for follow-up visit. I remind him about flu shot. Reportedly he had skin cancer removed from his nose by Dr. Ivana Boyce recently. He is agreeable to have blood test today and every 3 months. Advised to call for the test result. We discussed again about EtOH and diet. He denied any NVD. He denied melena or hematuria. No fever or chills. Past Medical History  Liver cirrhosis, hepatitis B, alcohol abuse, skin cancer of the nose, heterozygous H63D mutation. Surgical History  Resection of skin cancer from the nose. Allergies  No known medication allergies    Medications  Reviewed as per chart. Social History  He has more than 50-pack-year smoking history. Currently he smokes less than 1 pack/day. He drinks 2 to 3 cans beers per day. He denies any illicit drug use. He has 2 stepchildren. 2 biological sons  of drug use. Family History  No history of malignancy in the family    Review of Systems: \"Per interval history; otherwise 10 point ROS is negative. \"     Vital Signs:  /80 (Site: Right Upper Arm, Position: Sitting, Cuff Size: Medium Adult)   Pulse 97   Temp 98.7 °F (37.1 °C) 7.7 12/26/2019    LABALBU 2.9 (L) 12/26/2019    BILITOT 2.3 (H) 12/26/2019    ALKPHOS 191 (H) 12/26/2019    AST 73 (H) 12/26/2019    ALT 31 12/26/2019    LABGLOM >60 12/26/2019    GFRAA >60 12/26/2019     Lab Results   Component Value Date    TSHHS 2.220 05/29/2019    T4FREE 0.81 (L) 05/29/2019     Immunology:  Lab Results   Component Value Date    PROT 7.7 12/26/2019     Coagulation Panel:  Lab Results   Component Value Date    PROTIME 15.4 (H) 05/29/2019    INR 1.33 05/29/2019    APTT 31.2 12/17/2010     Anemia Panel:  Lab Results   Component Value Date    CNKNAEDT89 664.0 05/09/2017    FOLATE 9.7 05/09/2017      Observations:  PHQ-9 Total Score: 0 (9/25/2020 10:35 AM)      Assessment & Plan:    1. He has heterozygous H63D mutation. I will monitor his iron study. We discussed about alcohol cessation. He has thrombocytopenia. He had phlebotomy in October 2019. I will check his blood today. Advised to call for the result on Monday next week. I will check CBC and iron study every 3 months. 2. He has liver cirrhosis. He may benefit from elastography study. Serum alpha-fetoprotein in May 2019 was 7. I recommend to follow up with GI.    3. He has mild thrombocytopenia which could be related to underlying liver cirrhosis/hypersplenism. Macrocytosis is likely related to alcoholism and liver disease. 4. We discussed about healthy diet and lifestyle. He refused screening CT chest for lung cancer. I recommend flu shot. Return to clinic in 6 months or sooner. All of his questions have been answered for today. Recent imaging and labs were reviewed and discussed with the patient.       Electronically signed by Shaw Swanson MD on 9/23/20 at 11:09 AM EDT

## 2020-09-25 NOTE — PROGRESS NOTES
MA Rooming Questions  Patient: Aditi Fam  MRN: [id number]    Date: 9/25/2020        1. Do you have any new issues?   no         2. Do you need any refills on medications?    no    3. Have you had any imaging done since your last visit?   no    4. Have you been hospitalized or seen in the emergency room since your last visit here?   no    5. Did the patient have a depression screening completed today?  Yes    PHQ-9 Total Score: 0 (9/25/2020 10:35 AM)       PHQ-9 Given to (if applicable):               PHQ-9 Score (if applicable):                     [] Positive     []  Negative              Does question #9 need addressed (if applicable)                     [] Yes    []  No               Adarsh Kern

## 2020-12-08 PROBLEM — R22.1 NECK MASS: Status: ACTIVE | Noted: 2020-01-01

## 2020-12-08 PROBLEM — R74.8 ELEVATED LIVER ENZYMES: Status: ACTIVE | Noted: 2020-01-01

## 2020-12-08 NOTE — PROGRESS NOTES
Reji President  1955  12/08/20    Chief Complaint   Patient presents with    Other     Patient states having a lump on left side of his neck. Patient denies any pain from the lump           Patient here c/o lump on the left side of the neck X 3 wks, not painfull, no fever, no headache, no teeth problems, no wt changes, no SOB, no chest pain, dose smoke. patient f/u with the oncology for his liver cirrhosis, he is having yellow discoloration of his conjunctiva. Denies abdominal pain, N/V, and no abdominal distension. Past Medical History:   Diagnosis Date    Cirrhosis of liver ([facility] [address]     ETOH abuse     Hepatitis C 1977    Skin cancer     nose, resected    Smoker      History reviewed. No pertinent surgical history. History reviewed. No pertinent family history. Social History     Socioeconomic History    Marital status:      Spouse name: Not on file    Number of children: Not on file    Years of education: Not on file    Highest education level: Not on file   Occupational History    Not on file   Social Needs    Financial resource strain: Not on file    Food insecurity     Worry: Not on file     Inability: Not on file    Transportation needs     Medical: Not on file     Non-medical: Not on file   Tobacco Use    Smoking status: Current Every Day Smoker     Packs/day: 2.00     Years: 40.00     Pack years: 80.00     Types: Cigarettes    Smokeless tobacco: Never Used   Substance and Sexual Activity    Alcohol use:  Yes     Alcohol/week: 8.0 standard drinks     Types: 8 Cans of beer per week     Comment: Heavy drinker in the past, 12 cans of beer and few shots of hard liquour  daily    Drug use: No    Sexual activity: Not Currently     Partners: Female   Lifestyle    Physical activity     Days per week: Not on file     Minutes per session: Not on file    Stress: Not on file   Relationships    Social connections     Talks on phone: Not on file     Gets together: Not on file     Attends Mosque service: Not on file     Active member of club or organization: Not on file     Attends meetings of clubs or organizations: Not on file     Relationship status: Not on file    Intimate partner violence     Fear of current or ex partner: Not on file     Emotionally abused: Not on file     Physically abused: Not on file     Forced sexual activity: Not on file   Other Topics Concern    Not on file   Social History Narrative    Not on file       No Known Allergies  Current Outpatient Medications   Medication Sig Dispense Refill    spironolactone (ALDACTONE) 50 MG tablet TAKE 1 TABLET BY MOUTH DAILY 30 tablet 5    furosemide (LASIX) 40 MG tablet TAKE 1 TABLET BY MOUTH DAILY 30 tablet 5    potassium chloride (KLOR-CON) 10 MEQ extended release tablet TAKE 1 TABLET BY MOUTH DAILY 30 tablet 5     No current facility-administered medications for this visit. Review of Systems   Constitutional: Negative for activity change, appetite change, chills, fatigue, fever and unexpected weight change. HENT: Negative for congestion. Eyes: Negative for visual disturbance. Respiratory: Negative for cough, chest tightness, shortness of breath and wheezing. Cardiovascular: Negative for chest pain and leg swelling. Gastrointestinal: Negative for abdominal pain, diarrhea and nausea. Genitourinary: Negative for dysuria and frequency. Musculoskeletal: Negative for back pain. Skin: Negative for rash. Neurological: Negative for dizziness and headaches. Psychiatric/Behavioral: Negative for agitation, behavioral problems and sleep disturbance. The patient is not nervous/anxious.         Lab Results   Component Value Date    WBC 4.8 09/25/2020    HGB 13.4 (L) 09/25/2020    HCT 35.0 (L) 09/25/2020    MCV 98.9 09/25/2020    PLT 65 (L) 09/25/2020     Lab Results   Component Value Date     09/25/2020    K 3.5 09/25/2020     09/25/2020    CO2 27 09/25/2020    BUN 6 09/25/2020    CREATININE 0.7 (L) 09/25/2020    GLUCOSE 122 (H) 09/25/2020    CALCIUM 8.3 09/25/2020    PROT 7.9 09/25/2020    LABALBU 2.8 (L) 09/25/2020    BILITOT 2.9 (H) 09/25/2020    ALKPHOS 256 (H) 09/25/2020    AST 80 (H) 09/25/2020    ALT 39 09/25/2020    LABGLOM >60 09/25/2020    GFRAA >60 09/25/2020     Lab Results   Component Value Date    CHOL 102 05/29/2019    CHOL 133 01/19/2011     Lab Results   Component Value Date    TRIG 62 05/29/2019    TRIG 58 01/19/2011     Lab Results   Component Value Date    HDL 37 (L) 05/29/2019    HDL 35 (L) 01/19/2011     Lab Results   Component Value Date    LDLCALC 86 01/19/2011     Lab Results   Component Value Date    LABA1C 5.2 05/29/2019     Lab Results   Component Value Date    TSHHS 2.220 05/29/2019         /79 (Site: Left Upper Arm, Position: Sitting, Cuff Size: Large Adult)   Pulse 107   Wt 189 lb 3.2 oz (85.8 kg)   SpO2 97%   BMI 25.66 kg/m²     BP Readings from Last 3 Encounters:   12/08/20 125/79   09/25/20 139/80   11/26/19 (!) 140/85       Wt Readings from Last 3 Encounters:   12/08/20 189 lb 3.2 oz (85.8 kg)   09/25/20 193 lb 9.6 oz (87.8 kg)   11/26/19 199 lb 9.6 oz (90.5 kg)         Physical Exam  Constitutional:       General: He is not in acute distress. Appearance: Normal appearance. He is well-developed. He is not ill-appearing or diaphoretic. HENT:      Head: Normocephalic and atraumatic. Eyes:      General: Scleral icterus present. Extraocular Movements: Extraocular movements intact. Pupils: Pupils are equal, round, and reactive to light. Neck:      Musculoskeletal: Normal range of motion and neck supple. No muscular tenderness. Comments: There is 3x3 cm firm mass on the left side of the neck, not tender, not movable  Cardiovascular:      Rate and Rhythm: Normal rate and regular rhythm. Heart sounds: Normal heart sounds. No murmur. Pulmonary:      Effort: Pulmonary effort is normal.      Breath sounds: Normal breath sounds.  No wheezing or rales.   Musculoskeletal: Normal range of motion. Right lower leg: No edema. Left lower leg: No edema. Skin:     Coloration: Skin is jaundiced. Neurological:      Mental Status: He is alert and oriented to person, place, and time. Psychiatric:         Mood and Affect: Mood normal.         Behavior: Behavior normal.         ASSESSMENT/ PLAN:    1. Neck mass  - CT SOFT TISSUE NECK WO CONTRAST; Future    2. Colon cancer screening  - POCT Fecal Immunochemical Test (FIT); Future    3. Alcoholic cirrhosis of liver without ascites ([facility] [address]  - already f/u with the oncology    4. Elevated liver enzymes  - already f/u with the oncology              - All old blood work reviewed with the patient  - Appropriate prescription are addressed. - After visit summery provided. - Questions answered and patient verbalizes understanding.  - Call for any problem, questions, or concerns. Return in about 3 months (around 3/8/2021).

## 2020-12-26 NOTE — ED NOTES
Cody bell (extra green, red), lactic acid (plasma), ammonia from patient's right median AC - 2nd attempt. 1st venipuncture in patient's left median AC - failed.       Bianca Manley  12/26/20 1513

## 2020-12-26 NOTE — ED PROVIDER NOTES
As physician-in-triage, I performed a medical screening history and physical exam on this patient. I performed a medical screening examination and evaluated this patient briefly ale tele-health platform with the purpose of initiating their ED workup in an expeditious manner. Please see notes from other ED providers regarding comprehensive evaluation including full history, physical exam, interpretation of results, and medical decision making/disposition. CHIEF COMPLAINT  Chief Complaint   Patient presents with    Dehydration     weakness reports history of liver disease       HISTORY OF PRESENT ILLNESS  Maeve Falk is a 77 y.o. male that presents to the emergency department for evaluation. Patient notes that in mid 2000, he was diagnosed with hepatitis and liver cirrhosis. Patient notes that he developed alcoholism after his wife and kids passed away. He has been drinking alcohol daily up until 1 week ago. Patient notes over the past couple days, he has had abdominal pain. His belly feels distended. He does have a history of abdominal paracentesis in the past, most recently in 2010. In addition, patient feels nauseous. Denies vomiting. He has had diarrhea. Denies constipation. Denies hematochezia or melena. Denies hematemesis or coffee-ground emesis. Denies fevers, chills, diaphoresis. Denies syncope, dizziness, lightheadedness. Denies chest pain, shortness of breath, pleuritic pain. Denies additional precipitating, modifying, alleviating factors. Patient notes that gastroenterologist wanted him evaluated for transplant, however, patient states \"I not see the point in that. \"  Denies additional precipitating, modifying, alleviating factors. PHYSICAL EXAM  /70   Pulse 93   Temp 97.9 °F (36.6 °C) (Oral)   Resp 18   Ht 6' (1.829 m)   Wt 186 lb (84.4 kg)   SpO2 96%   BMI 25.23 kg/m²     On exam, the patient appears in no acute distress. Speech is clear.  Breathing is unlabored. Moves all extremities    Comment: Please note this report has been produced using speech recognition software and may contain errors related to that system including errors in grammar, punctuation, and spelling, as well as words and phrases that may be inappropriate. If there are any questions or concerns please feel free to contact the dictating provider for clarification.        [address] [facility],   12/26/20 5689

## 2020-12-26 NOTE — ED PROVIDER NOTES
Emergency Department Encounter    Patient: Emiliano Sheets  MRN: [id number]  : 1955  Date of Evaluation: 2020  ED Provider:  Yaneth Salinas    Triage Chief Complaint:   Dehydration (weakness reports history of liver disease)    [place]:  Emiliano Sheets is a 77 y.o. male that presents with concern for dehydration. He has a history of cirrhosis and liver disease. In mid  he was diagnosed with hepatitis C and cirrhosis, developed alcoholism after his wife and kids passed. Drinks alcohol daily up until a week ago. Has not been drinking alcohol over the last week. In general is noted some distention of his abdomen, not true pain, describes it as feeling like it is getting distended more swollen. He has been having some diarrhea. He is having decreased urine output in his urine is very very dark, not eating or drinking well and so feels like he might be dehydrated, having nausea without vomiting. No constipation. No blood in his stools or dark stools that he is noted. No fevers or chills. No syncope or lightheadedness. No shortness of breath or chest pain. He had previously seen a GI doctor but he does not recall who, they had wanted to refer him for possible transplant and he had declined at that time to be referred. Denies other drug use. ROS - see HPI, below listed is current ROS at time of my eval:  10 systems reviewed and negative except as above. Past Medical History:   Diagnosis Date    Cirrhosis of liver ([facility] [address]     ETOH abuse     Hepatitis C     Skin cancer     nose, resected    Smoker      No past surgical history on file. No family history on file. Social History     Socioeconomic History    Marital status:       Spouse name: Not on file    Number of children: Not on file    Years of education: Not on file    Highest education level: Not on file   Occupational History    Not on file   Social Needs    Financial resource strain: Not on file   Luis-Chelsy insecurity     Worry: Not on file     Inability: Not on file    Transportation needs     Medical: Not on file     Non-medical: Not on file   Tobacco Use    Smoking status: Current Every Day Smoker     Packs/day: 2.00     Years: 40.00     Pack years: 80.00     Types: Cigarettes    Smokeless tobacco: Never Used   Substance and Sexual Activity    Alcohol use:  Yes     Alcohol/week: 8.0 standard drinks     Types: 8 Cans of beer per week     Comment: Heavy drinker in the past, 12 cans of beer and few shots of hard liquour  daily    Drug use: No    Sexual activity: Not Currently     Partners: Female   Lifestyle    Physical activity     Days per week: Not on file     Minutes per session: Not on file    Stress: Not on file   Relationships    Social connections     Talks on phone: Not on file     Gets together: Not on file     Attends Yazidi service: Not on file     Active member of club or organization: Not on file     Attends meetings of clubs or organizations: Not on file     Relationship status: Not on file    Intimate partner violence     Fear of current or ex partner: Not on file     Emotionally abused: Not on file     Physically abused: Not on file     Forced sexual activity: Not on file   Other Topics Concern    Not on file   Social History Narrative    Not on file     Current Facility-Administered Medications   Medication Dose Route Frequency Provider Last Rate Last Admin    0.9 % sodium chloride infusion   Intravenous Continuous Lenore MD Fredo 75 mL/hr at 12/26/20 1726 New Bag at 12/26/20 1726     Current Outpatient Medications   Medication Sig Dispense Refill    spironolactone (ALDACTONE) 50 MG tablet TAKE 1 TABLET BY MOUTH DAILY 30 tablet 5    furosemide (LASIX) 40 MG tablet TAKE 1 TABLET BY MOUTH DAILY 30 tablet 5    potassium chloride (KLOR-CON) 10 MEQ extended release tablet TAKE 1 TABLET BY MOUTH DAILY 30 tablet 5     No Known Allergies    Nursing Notes Reviewed    Physical Exam:  Triage VS:    ED Triage Vitals [12/26/20 1323]   Enc Vitals Group      /70      Pulse 93      Resp 18      Temp 97.9 °F (36.6 °C)      Temp Source Oral      SpO2 96 %      Weight 186 lb (84.4 kg)      Height 6' (1.829 m)      Head Circumference       Peak Flow       Pain Score       Pain Loc       Pain Edu? Excl. in [address]? My pulse ox interpretation is - normal    General appearance:  No acute distress. Skin:  Warm. Dry. Jaundice noted,  Eye:  Extraocular movements intact. Scleral icterus noted. Ears, nose, mouth and throat:  Oral mucosa moist   Neck:  Trachea midline. Extremity:  No swelling. Normal ROM     Heart:  Regular rate and rhythm, normal S1 & S2, no extra heart sounds. Perfusion:  intact  Respiratory:  Lungs clear to auscultation bilaterally. Respirations nonlabored. Abdominal:  Normal bowel sounds. Soft. Nontender.  Mild to moderate distention  Neurological:  Alert and oriented           Psychiatric:  Appropriate    I have reviewed and interpreted all of the currently available lab results from this visit (if applicable):  Results for orders placed or performed during the hospital encounter of 12/26/20   CBC Auto Differential   Result Value Ref Range    WBC 10.0 4.0 - 10.5 K/CU MM    RBC 3.60 (L) 4.6 - 6.2 M/CU MM    Hemoglobin 13.3 (L) 13.5 - 18.0 GM/DL    Hematocrit 37.4 (L) 42 - 52 %    .9 (H) 78 - 100 FL    MCH 36.9 (H) 27 - 31 PG    MCHC 35.6 32.0 - 36.0 %    RDW 18.4 (H) 11.7 - 14.9 %    Platelets 98 (L) 025 - 440 K/CU MM    MPV 12.1 (H) 7.5 - 11.1 FL    Differential Type AUTOMATED DIFFERENTIAL     Segs Relative 74.3 (H) 36 - 66 %    Lymphocytes % 12.5 (L) 24 - 44 %    Monocytes % 12.2 (H) 0 - 4 %    Eosinophils % 0.2 0 - 3 %    Basophils % 0.2 0 - 1 %    Segs Absolute 7.5 K/CU MM    Lymphocytes Absolute 1.3 K/CU MM    Monocytes Absolute 1.2 K/CU MM    Eosinophils Absolute 0.0 K/CU MM    Basophils Absolute 0.0 K/CU MM    Nucleated RBC % 0.0 %    Total Nucleated RBC 0.0 K/CU MM    Total Immature Neutrophil 0.06 K/CU MM    Immature Neutrophil % 0.6 (H) 0 - 0.43 %   Comprehensive Metabolic Panel w/ Reflex to MG   Result Value Ref Range    Sodium 118 (LL) 135 - 145 MMOL/L    Potassium 3.8 3.5 - 5.1 MMOL/L    Chloride 81 (L) 99 - 110 mMol/L    CO2 26 21 - 32 MMOL/L    BUN 18 6 - 23 MG/DL    CREATININE 1.1 0.9 - 1.3 MG/DL    Glucose 102 (H) 70 - 99 MG/DL    Calcium 7.8 (L) 8.3 - 10.6 MG/DL    Alb 2.0 (L) 3.4 - 5.0 GM/DL    Total Protein 8.5 (H) 6.4 - 8.2 GM/DL    Total Bilirubin 13.5 (H) 0.0 - 1.0 MG/DL     (H) 10 - 40 U/L     (H) 15 - 37 IU/L    Alkaline Phosphatase 387 (H) 40 - 129 IU/L    GFR Non-African American >60 >60 mL/min/1.73m2    GFR African American >60 >60 mL/min/1.73m2    Anion Gap 11 4 - 16   Protime-INR   Result Value Ref Range    Protime 34.9 (H) 11.7 - 14.5 SECONDS    INR 2.85 INDEX   Bilirubin total direct & indirect   Result Value Ref Range    Total Bilirubin 13.5 (H) 0.0 - 1.0 MG/DL    Bilirubin, Direct 9.8 (H) 0.0 - 0.3 MG/DL    Bilirubin, Indirect 3.7 (H) 0 - 0.7 MG/DL   Ammonia   Result Value Ref Range    Ammonia 34 16 - 60 UMOL/L   Lactic Acid, Plasma   Result Value Ref Range    Lactate 3.2 (HH) 0.4 - 2.0 mMOL/L   POCT Glucose   Result Value Ref Range    Glucose 89 mg/dL   POCT Glucose   Result Value Ref Range    POC Glucose 89 70 - 99 MG/DL   EKG 12 Lead   Result Value Ref Range    Ventricular Rate 82 BPM    Atrial Rate 82 BPM    P-R Interval 178 ms    QRS Duration 106 ms    Q-T Interval 398 ms    QTc Calculation (Bazett) 464 ms    P Axis 43 degrees    R Axis 4 degrees    T Axis -3 degrees    Diagnosis       Sinus rhythm with occasional premature ventricular complexes  Nonspecific T wave abnormality  Abnormal ECG  No previous ECGs available        Radiographs (if obtained):  Radiologist's Report Reviewed:  No results found.     EKG (if obtained): (All EKG's are interpreted by myself in the absence of a cardiologist)  Sinus rhythm with occasional PVCs, rate of 82 bpm, normal intervals. No ST elevation. Nonspecific T wave abnormality. No previous to compare      MDM:  68-year-old male with history as above presents with concern for abdominal distention, diarrhea, jaundice, fatigue, poor urine output and darkening urine. His vitals are stable. He does look like he is likely an acute to subacute liver failure, has a known history of cirrhosis. He is jaundiced, distended. Labs were sent and show bilirubin of 13.5, mild elevation in ALT and AST, creatinine of 1.1 but his sodium is 118, I have started him on gentle fluids at 75 mL/h normal saline. His lactic acid is elevated but I suspect this is more likely related to his liver not clearing this rather than any acute infection, his white count is normal, no fevers, heart rate is normal.  CT with cholelithiasis some mild gallbladder wall thickening but he has absolutely no tenderness on my exam with a normal white count, no vomiting, no tachycardia, no fevers, I am not concerned for infection there. Has biliary duct dilation, mild to moderate ascites, medullary nephrocalcinosis. I have spoken with our GI team here, Dr. Rober Drew on-call as the GI doctor he had seen here previously is not available after multiple tries by our unit clerk. I have calculated his MELD score at 33 and he will require higher level of care/specialist evaluation that we cannot provide here, plan for transfer to [facility]. Patient is agreeable to this. I have spoken with Mervin Rausch in transfer center, they have accepted to their ED by Dr. Derick Cruz. We will arrange transport. Clinical Impression:  1. Subacute liver failure without hepatic coma      Disposition referral (if applicable):  No follow-up provider specified.   Disposition medications (if applicable):  New Prescriptions    No medications on file     ED Provider Disposition Time  DISPOSITION Decision To Transfer 12/26/2020 05:57:28 PM      Comment: Please note this report has been produced using speech recognition software and may contain errors related to that system including errors in grammar, punctuation, and spelling, as well as words and phrases that may be inappropriate. Efforts were made to edit the dictations. Anton Lyon MD  12/26/20 1805        Patient had initially been reluctant to go to [facility], states \"where they can I do offer me a transplant, I am too old for that to take a good liver\". He had initially agreed to go to [facility] to be evaluated but while he is been here has been perseverating back and forth that he does not really want to go anything else that he will know when it gets that bad, will come back if it gets worse but he does not want to be admitted to the hospital at all now at this point. I went back to speak to him again just before 9 PM as we were awaiting transport at any time and he is adamant this time that he wants to leave. I have explained to him that with the point that his liver failure is at right now, with the numbers that we have obtained today, that we use something called a meld score, and this is basically a score of how bad his liver failure is, and gives this a better estimation of mortality and other morbidity for his disease, and that his timeframe for mortality is potentially 2 to 3 months, he has a 52% chance of death within the next 3 months related to his underlying liver disease and failure. He expresses understanding of this, understands that if his bilirubin would continue to climb that he could go into a coma, he could start having seizures, etc.  That this could cause severe disability and permanent disability. He understands that this could cause death. He continues to state that he wants to just go home tonight and call and arrange follow-up on Monday.   I explained that this is not something that he would just be able to get into these super specialized physicians offices within a day or 2, usually requires referrals, will likely take a few weeks to get on the schedule, etc.  That in that timeframe he could worsen to the point of not having other options then potentially transplant or he may not even be a transplant candidate. At this point if we can get him over there to see the specialist there are potentially other options like TIPS procedures, etc. that he would still be appropriate for, and if he is adamantly against transplant then these were the only things that would potentially keep him from going into fulminant liver failure and death in potentially the next few weeks to months. He understands this, is alert and oriented, he tells me that he will follow up with his doctor on Monday to get the appropriate referrals but at this time he truly does not wish to be admitted to the hospital.  He has seen his wife Zoe Duverney 2 sons die and he knows how the process goes and if he feels worse he will come in but he is not interested in a transplant at his age and does not want to explore other options right now. He knows he is leaving against my medical advice and knows he can return at any time for re-evaluation, treatment and transfer. I advised that if things were to change in the next 20 minutes that he can always check back into the department and we will get him transferred. He is very apologetic, thanking us for his care and does not want us to be angry. I explained that we are not angry with him and we want to help him and if he does not want to go to [facility] we certainly are not going to force him to do so, we just want to help him right now. He is welcome to come back at any time.   Discharged against my medical advice       Jaclynn Canavan, MD  12/26/20 9225

## 2020-12-27 NOTE — ED NOTES
Reviewed d/c instructions and AMA form with patient. Patient verbalized understanding.        Juhi Carson RN  12/26/20 3952

## 2020-12-27 NOTE — ED NOTES
Pt discussing leaving and not going to [facility]. Pt states \"there is nothing they can do for me anyways\". Advised Dr. Renita Forde.      Osito La RN  12/26/20 2111

## 2020-12-28 NOTE — TELEPHONE ENCOUNTER
I called the patient, left a message to go to the ED because of the his health situation, and also for the mass of the neck, which most likely cancer, will send him to the ENT for urgent referral.  Patient went 2 days ago to the ED and left AMA.     Please Donnie Ontiveros take care of the referral ASAP

## 2020-12-29 PROBLEM — K74.60 HEPATIC CIRRHOSIS (HCC): Status: ACTIVE | Noted: 2020-01-01

## 2020-12-29 NOTE — ED NOTES
Spoke to hospitalist regarding pt's BP and levophed, per hospitalist hold on levophed for now. Continue to watch pressure.      Chelsea Morales RN  12/29/20 2406

## 2020-12-29 NOTE — PROGRESS NOTES
Pt seen and examined in er and dw daughter on phone. Wife passed 2 year ago and stopped etoh about 10 days ago and now with arf on possible ckd 1. Was on lasix with aldactone and now na low as well as worse cirrhosis. Saw dr Rosetta Hathaway in past and not recent fu and aware was not caring well for himself and with fatigue came to er and noted abnormal labs needing work up. No acute dialysis for now and concern for hepatorenal. Start ns ivf and full note to follow.

## 2020-12-29 NOTE — ED PROVIDER NOTES
[facility]      TRIAGE CHIEF COMPLAINT:   Leg Swelling (bilateral leg swelling ), Constipation, and Abdominal Pain      [place]:  Rico Nieves is a 77 y.o. male that presents complaint of lower leg swelling constipation abdominal pain. History of cirrhosis has been jaundiced he states for last couple weeks. Denies any fevers chest pain shortness of breath has had again nausea vomiting abdominal pain constipation swelling. On arrival his blood pressure was 81/44 I quickly saw patient patient denies other questions or concerns. REVIEW OF SYSTEMS:  At least 10 systems reviewed and otherwise acutely negative except as in the [address] Review of Systems   Constitutional: Positive for activity change, appetite change and fatigue. HENT: Negative. Eyes: Negative. Respiratory: Negative. Cardiovascular: Positive for leg swelling. Gastrointestinal: Positive for abdominal distention, abdominal pain, constipation and nausea. Endocrine: Negative. Genitourinary: Negative. Musculoskeletal: Negative. Skin: Positive for color change. Allergic/Immunologic: Negative. Neurological: Positive for weakness. Hematological: Negative. Psychiatric/Behavioral: Negative. All other systems reviewed and are negative. Past Medical History:   Diagnosis Date    Cirrhosis of liver ([address]     ETOH abuse     Hepatitis C 1977    Skin cancer     nose, resected    Smoker      History reviewed. No pertinent surgical history. History reviewed. No pertinent family history. Social History     Socioeconomic History    Marital status:       Spouse name: Not on file    Number of children: Not on file    Years of education: Not on file    Highest education level: Not on file   Occupational History    Not on file   Social Needs    Financial resource strain: Not on file    Food insecurity     Worry: Not on file     Inability: Not on file    Transportation needs     Medical: Not on file     Non-medical: Not on file   Tobacco Use    Smoking status: Current Every Day Smoker     Packs/day: 2.00     Years: 40.00     Pack years: 80.00     Types: Cigarettes    Smokeless tobacco: Never Used   Substance and Sexual Activity    Alcohol use:  Yes     Alcohol/week: 8.0 standard drinks     Types: 8 Cans of beer per week     Comment: Heavy drinker in the past, 12 cans of beer and few shots of hard liquour  daily    Drug use: No    Sexual activity: Not Currently     Partners: Female   Lifestyle    Physical activity     Days per week: Not on file     Minutes per session: Not on file    Stress: Not on file   Relationships    Social connections     Talks on phone: Not on file     Gets together: Not on file     Attends Orthodox service: Not on file     Active member of club or organization: Not on file     Attends meetings of clubs or organizations: Not on file     Relationship status: Not on file    Intimate partner violence     Fear of current or ex partner: Not on file     Emotionally abused: Not on file     Physically abused: Not on file     Forced sexual activity: Not on file   Other Topics Concern    Not on file   Social History Narrative    Not on file     Current Facility-Administered Medications   Medication Dose Route Frequency Provider Last Rate Last Admin    norepinephrine (LEVOPHED) 16 mg in dextrose 5% 250 mL infusion  2 mcg/min Intravenous Continuous Yeny Dragon, DO        vancomycin (VANCOCIN) 1,750 mg in dextrose 5 % 500 mL IVPB  20 mg/kg Intravenous Once Yeny Dragon, DO        0.9 % sodium chloride infusion   Intravenous Continuous Yeny Dragon, DO         Current Outpatient Medications   Medication Sig Dispense Refill    spironolactone (ALDACTONE) 50 MG tablet TAKE 1 TABLET BY MOUTH DAILY 30 tablet 5    furosemide (LASIX) 40 MG tablet TAKE 1 TABLET BY MOUTH DAILY 30 tablet 5    potassium chloride (KLOR-CON) 10 MEQ extended release tablet TAKE 1 TABLET BY MOUTH DAILY 30 tablet 5      No Known Allergies  Current Facility-Administered Medications   Medication Dose Route Frequency Provider Last Rate Last Admin    norepinephrine (LEVOPHED) 16 mg in dextrose 5% 250 mL infusion  2 mcg/min Intravenous Continuous Alona Chin, DO        vancomycin (VANCOCIN) 1,750 mg in dextrose 5 % 500 mL IVPB  20 mg/kg Intravenous Once Alona Chin, DO        0.9 % sodium chloride infusion   Intravenous Continuous Alona Chin, DO         Current Outpatient Medications   Medication Sig Dispense Refill    spironolactone (ALDACTONE) 50 MG tablet TAKE 1 TABLET BY MOUTH DAILY 30 tablet 5    furosemide (LASIX) 40 MG tablet TAKE 1 TABLET BY MOUTH DAILY 30 tablet 5    potassium chloride (KLOR-CON) 10 MEQ extended release tablet TAKE 1 TABLET BY MOUTH DAILY 30 tablet 5       Nursing Notes Reviewed    VITAL SIGNS:  ED Triage Vitals   Enc Vitals Group      BP       Pulse       Resp       Temp       Temp src       SpO2       Weight       Height       Head Circumference       Peak Flow       Pain Score       Pain Loc       Pain Edu? Excl. in [address]? PHYSICAL EXAM:  Physical Exam  Vitals signs and nursing note reviewed. Constitutional:       General: He is not in acute distress. Appearance: He is well-developed and well-groomed. He is ill-appearing. He is not toxic-appearing or diaphoretic. Interventions: Nasal cannula in place. HENT:      Head: Normocephalic and atraumatic. Right Ear: External ear normal.      Left Ear: External ear normal.      Nose: No congestion or rhinorrhea. Eyes:      General: Scleral icterus present. Right eye: No discharge. Left eye: No discharge. Extraocular Movements: Extraocular movements intact. Pupils: Pupils are equal, round, and reactive to light. Neck:      Musculoskeletal: Full passive range of motion without pain and normal range of motion. Normal range of motion.  No edema, erythema, neck rigidity, crepitus, injury, pain with movement or torticollis. Vascular: No JVD. Trachea: Phonation normal.   Cardiovascular:      Rate and Rhythm: Normal rate and regular rhythm. Pulses: Normal pulses. Heart sounds: Normal heart sounds. No murmur. No friction rub. No gallop. Pulmonary:      Effort: Pulmonary effort is normal. No respiratory distress. Breath sounds: Normal breath sounds. No stridor. No wheezing, rhonchi or rales. Abdominal:      General: Bowel sounds are normal. There is distension. There are no signs of injury. Palpations: Abdomen is soft. There is fluid wave. There is no mass or pulsatile mass. Tenderness: There is generalized abdominal tenderness. There is no guarding or rebound. Negative signs include Valencia's sign, Rovsing's sign and McBurney's sign. Hernia: No hernia is present. Musculoskeletal:         General: Swelling present. No tenderness, deformity or signs of injury. Right lower leg: Edema present. Left lower leg: Edema present. Skin:     Coloration: Skin is jaundiced. Skin is not pale. Findings: No bruising, erythema, lesion or rash. Neurological:      General: No focal deficit present. Mental Status: He is alert and oriented to person, place, and time. GCS: GCS eye subscore is 4. GCS verbal subscore is 5. GCS motor subscore is 6. Cranial Nerves: Cranial nerves are intact. No cranial nerve deficit, dysarthria or facial asymmetry. Sensory: Sensation is intact. No sensory deficit. Motor: Motor function is intact. No weakness, tremor, atrophy, abnormal muscle tone or seizure activity. Coordination: Coordination is intact. Coordination normal.   Psychiatric:         Mood and Affect: Mood normal.         Behavior: Behavior normal. Behavior is cooperative. Thought Content:  Thought content normal.         Judgment: Judgment normal.           I have reviewed andinterpreted all of the currently available lab results from this visit (if applicable):    Results for orders placed or performed during the hospital encounter of 12/29/20   CBC Auto Differential   Result Value Ref Range    WBC 10.5 4.0 - 10.5 K/CU MM    RBC 3.35 (L) 4.6 - 6.2 M/CU MM    Hemoglobin 12.5 (L) 13.5 - 18.0 GM/DL    Hematocrit 34.1 (L) 42 - 52 %    .8 (H) 78 - 100 FL    MCH 37.3 (H) 27 - 31 PG    MCHC 36.7 (H) 32.0 - 36.0 %    RDW 17.8 (H) 11.7 - 14.9 %    Platelets 98 (L) 342 - 440 K/CU MM    MPV 11.8 (H) 7.5 - 11.1 FL    Bands Relative 28 (H) 5 - 11 %    Segs Relative 58.0 36 - 66 %    Lymphocytes % 11.0 (L) 24 - 44 %    Monocytes % 3.0 0 - 4 %    Bands Absolute 2.94 K/CU MM    Segs Absolute 6.1 K/CU MM    Lymphocytes Absolute 1.2 K/CU MM    Monocytes Absolute 0.3 K/CU MM    Differential Type MANUAL DIFFERENTIAL     Anisocytosis 1+     Macrocytes 1+     Polychromasia 1+     Target Cells 1+     Toxic Granulation PRESENT     PLT Morphology SEVERAL LARGE PLATELETS    Protime/INR & PTT   Result Value Ref Range    Protime 48.9 (H) 11.7 - 14.5 SECONDS    INR 3.99 INDEX    aPTT 54.1 (H) 25.1 - 37.1 SECONDS   Ammonia Level   Result Value Ref Range    Ammonia 38 16 - 60 UMOL/L   Lactic Acid, Plasma   Result Value Ref Range    Lactate 5.2 (HH) 0.4 - 2.0 mMOL/L   SPECIMEN REJECTION   Result Value Ref Range    Rejected Test CMPR CPK MG LIPA PBNP TSHS     Reason for Rejection UNABLE TO PERFORM TESTING:    Comprehensive Metabolic Panel   Result Value Ref Range    Sodium 111 (LL) 135 - 145 MMOL/L    Potassium 4.4 3.5 - 5.1 MMOL/L    Chloride 81 (L) 99 - 110 mMol/L    CO2 19 (L) 21 - 32 MMOL/L    BUN 35 (H) 6 - 23 MG/DL    CREATININE 1.7 (H) 0.9 - 1.3 MG/DL    Glucose 60 (L) 70 - 99 MG/DL    Calcium 7.2 (L) 8.3 - 10.6 MG/DL    Alb 1.4 (L) 3.4 - 5.0 GM/DL    Total Protein 6.6 6.4 - 8.2 GM/DL    Total Bilirubin 14.8 (H) 0.0 - 1.0 MG/DL     (H) 10 - 40 U/L     (H) 15 - 37 IU/L    Alkaline Phosphatase 265 (H) 40 - 129 IU/L GFR Non-African American 40 (L) >60 mL/min/1.73m2    GFR  49 (L) >60 mL/min/1.73m2    Anion Gap 11 4 - 16   CK   Result Value Ref Range    Total  (H) 38 - 174 IU/L   Lipase   Result Value Ref Range    Lipase 40 13 - 60 IU/L   Magnesium   Result Value Ref Range    Magnesium 2.4 1.8 - 2.4 mg/dl   Brain Natriuretic Peptide   Result Value Ref Range    Pro-.6 (H) <300 PG/ML   TSH without Reflex   Result Value Ref Range    TSH, High Sensitivity 1.170 0.270 - 4.20 uIu/ml        Radiographs (if obtained):  [] The following radiograph was interpreted by myself in the absence of a radiologist:  [x] Radiologist's Report Reviewed:    CXR, CT Abd pelv, VL duplex legs    Ct Abdomen Pelvis Wo Contrast Additional Contrast? None    Result Date: 12/26/2020  EXAMINATION: CT OF THE ABDOMEN AND PELVIS WITHOUT CONTRAST 12/26/2020 4:45 pm TECHNIQUE: CT of the abdomen and pelvis was performed without the administration of intravenous contrast. Multiplanar reformatted images are provided for review. Dose modulation, iterative reconstruction, and/or weight based adjustment of the mA/kV was utilized to reduce the radiation dose to as low as reasonably achievable. COMPARISON: None. HISTORY: ORDERING SYSTEM PROVIDED HISTORY: abdominal pain, cirrhosis TECHNOLOGIST PROVIDED HISTORY: Reason for exam:->abdominal pain, cirrhosis Additional Contrast?->None Reason for Exam: dehydration, ABD pain, Cirrhosis FINDINGS: Lower Chest: Subsegmental atelectasis within both lung bases. There is a 1.5 x 0.8 cm noncalcified nodule in the right upper lobe. Coronary artery atherosclerosis. Organs: Liver is cirrhotic in configuration. There are calcified granulomas within the spleen. Within the limitations of a noncontrast examination, no focal abnormality within the pancreas or adrenal glands. There is cholelithiasis with marked gallbladder wall thickening.   There is central hepatic hypoattenuation, which appears to be due to markedly distended intrahepatic bile ducts. .  Hyperattenuation within the medullary pyramids. No hydronephrosis. GI/Bowel: Moderate hiatal hernia. The small bowel is nondilated. The colon is nondilated with scattered sigmoid diverticula. The appendix is not confidently identified. Pelvis: Bladder is partially distended without vesicular stone. Prostate is upper limits of normal in size. Peritoneum/Retroperitoneum: Small to moderate ascites. No pneumoperitoneum. Atherosclerosis of the nondilated abdominal aorta. Bones/Soft Tissues: Mild anterior wedging at L2. Multilevel degenerative changes of the lumbar spine. Sclerotic lesions in the left iliac bone, possibly bone islands. 1. Cholelithiasis with marked gallbladder wall thickening, concerning for cholecystitis. 2. Central hepatic hypoattenuation, which appears to be due to dilated intrahepatic bile ducts. Hepatic protocol MRI or MRCP could provide further information as clinically indicated. 3. Cirrhosis with small to moderate ascites. 4. 1.5 cm noncalcified right upper lobe nodule. 5. Findings suggestive of medullary nephrocalcinosis. 6. Age indeterminate anterior wedging at L2. Ct Soft Tissue Neck W Contrast    Result Date: 12/28/2020  EXAMINATION: CT OF THE NECK SOFT TISSUE WITH CONTRAST,  12/28/2020 TECHNIQUE: CT of the neck was performed with the administration of intravenous contrast. Multiplanar reformatted images are provided for review. Dose modulation, iterative reconstruction, and/or weight based adjustment of the mA/kV was utilized to reduce the radiation dose to as low as reasonably achievable. COMPARISON: None HISTORY: ORDERING SYSTEM PROVIDED HISTORY: Neck mass TECHNOLOGIST PROVIDED HISTORY: Reason for Exam: Neck mass marked with marker, hx cirrhosis, hepatitis, headache, Inj 75cc Isovue 370 LT AC followed by saline flush, GFR >60 12/26/20 Nontender left neck mass for 1 month.  FINDINGS: PHARYNX/LARYNX:  There is a 1 cm ovoid focus of abnormal soft tissue within the vallecula to the left of midline. The epiglottis is normal in appearance. The glottis is normal in appearance. The tongue is normal in appearance. There is no airway narrowing. There is an irregular soft tissue mass centered along the right tonsillar fossa extending anteriorly along the soft palate and crossing the midline. The mass measures 1.9 x 3.2 x 1.7 cm in AP by transverse by craniocaudal dimension. SALIVARY GLANDS/THYROID:  The parotid glands and submandibular glands are normal in appearance. The thyroid gland is normal in appearance. LYMPH NODES:  There is a necrotic left level 3 dalia mass deep to the left sternocleidomastoid muscle, posterior displacing the left internal jugular vein. This necrotic dalia mass measures 3.0 x 1.8 x 3.3 cm in AP by transverse by craniocaudal dimension. There is a necrotic right level 2 cervical lymph node measuring 1.9 x 1.6 x 2.0 cm. There is no other pathologically enlarged lymphadenopathy identified. BRAIN/ORBITS/SINUSES:  Limited evaluation of the brain and orbits is unremarkable. There is mucosal thickening within the left maxillary sinus. The paranasal sinuses and mastoid air cells are otherwise clear. LUNG APICES/SUPERIOR MEDIASTINUM:  The lung apices are clear. There is no superior mediastinal lymphadenopathy. BONES:  No lytic or blastic osseous lesions are identified. 1. Irregular soft tissue mass centered at the right tonsillar fossa extending anteriorly along the soft palate and crossing the midline. The mass measures 1.9 x 3.2 x 1.7 cm and is consistent with a squamous cell carcinoma until proven otherwise. 2. Necrotic left level 3 cervical lymph node measuring 3.0 x 1.8 x 3.3 cm corresponding to the area of palpable concern. There is an additional right level 2 cervical lymph node measuring 1.9 x 1.6 x 2.0 cm, consistent with bilateral cervical dalia metastatic disease.  3. Likely incidental 1 cm soft tissue nodule within the vallecula to the left of midline. RECOMMENDATIONS: Direct visualization and PET-CT is recommended for further evaluation. The findings were sent to the Radiology Results [address] at 12:13 pm on 12/28/2020 to be communicated to a licensed caregiver. EKG (if obtained): (All EKG's are interpreted by myself in the absence of a cardiologist)      Total critical care time today provided was 60 minutes. This excludes seperately billable procedure. Critical care time provided for reviewing labs, images, giving fluids, antibiotics, consulting nephrology, giving dextrose, giving oxygen, consulting medicine that required close evaluation and/or intervention with concern for patient decompensation. MDM:    Patient here with bilateral lower extremity swelling abdominal pain nausea vomiting constipation. Again history of cirrhosis due to drinking hepatitis he has been sick for the past couple weeks jaundice for the past couple weeks. On arrival he is ANO x3 but he is jaundiced head to toe as well as abdominal distention and hypotensive 81/44 quickly saw patient did order labs imaging IV fluids pressors peripheral IV central line team otherwise vital signs are stable I did wear 95 mask and eye protection and gloves. Patient will need admission. Patient doing better with IV fluids I did order pressors if needed he does have a peripheral IV central line and now. Doing better with fluids work-up performed lactic acid is elevated possibly has pneumonia will check a Covid also hyponatremic and hyperglycemic given dextrose fluids antibiotics. Chest x-ray again shows pneumonia awaiting ultrasound of his legs and CT abdomen pelvis. I did admit patient to hospital medicine given all the above findings jaundice elevated liver function lactic acid normal electrolytes and normal vital signs. Patient again admitted pending abdominal CT and ultrasound of legs.   Currently not needing pressors at this moment    CLINICAL IMPRESSION:  Final diagnoses:   Leg swelling   Constipation, unspecified constipation type   Hepatic cirrhosis, unspecified hepatic cirrhosis type, unspecified whether ascites present (HCC)   Abdominal pain, unspecified abdominal location   Hypotension, unspecified hypotension type   Jaundice   Pneumonia due to organism   Hyponatremia   LFT elevation       (Please note that portions of this note may have been completed with a voice recognition program. Efforts were made to edit the dictations but occasionally words aremis-transcribed.)    DISPOSITION REFERRAL (if applicable):  No follow-up provider specified.     DISPOSITION MEDICATIONS (if applicable):  New Prescriptions    No medications on file          Jermaine Ibanez, [facility],   12/29/20 4812

## 2020-12-29 NOTE — ED NOTES
Per Dr. David Dobbins okay to hold off on levophed right now.       Heavenly Young RN  12/29/20 9499

## 2020-12-29 NOTE — ED NOTES
Patient's daughter Anju Russell arrives and provides contact number [phone] and requests to be contacted with updates. Rylan Contreras  12/29/20 9044

## 2020-12-29 NOTE — CONSULTS
Consult completed. Procedure/rationale explained to pt including benefits vs potential risks/complications; questions answered & consent obtained. #5Fr Triple Lumen PowerPICC HF initiated to RUE Brachial Vein using sterile, UltraSound-guided technique without difficulty/complications. Positioning verified via TPS & 3CG with appropriate P-wave changes noted. Sterile dressing with SkinPrep, StatLock Securing Device, BioPatch, SwabCaps, and Limb Precautions band applied. Pt tolerated well & denies other c/o or needs. Primary RN & Dr. Marquis Velazquez notified.

## 2020-12-29 NOTE — PROGRESS NOTES
Medication History  [facility]    Patient Name: Yoselin Adams 1955     Medication history has been completed by: Josue Orr CPhT    Source(s) of information: patient and insurance claims     Primary Care Physician: Jessica Gallegos MD     Pharmacy: [facility]    Allergies as of 12/29/2020    (No Known Allergies)        Prior to Admission medications    Medication Sig Start Date End Date Taking? Authorizing Provider   spironolactone (ALDACTONE) 50 MG tablet TAKE 1 TABLET BY MOUTH DAILY 11/6/20  Yes Jessica Gallegos MD   furosemide (LASIX) 40 MG tablet TAKE 1 TABLET BY MOUTH DAILY 11/6/20  Yes Jessica Gallegos MD   potassium chloride (KLOR-CON) 10 MEQ extended release tablet TAKE 1 TABLET BY MOUTH DAILY 11/6/20  Yes Jessica Gallegos MD     Comments:  Medication list reviewed with patient and insurance claims verified.   Patient states he has been out of his potassium and has been substituting OTC potassium stating \"I hope I'm taking the right dose\"    To my knowledge the above medication history is accurate as of 12/29/2020 3:53 PM.   Josue Orr CPhT   12/29/2020 3:53 PM

## 2020-12-29 NOTE — H&P
History and Physical      Name:  Giles Arias /Age/Sex: 1955  (77 y.o. male)   MRN & CSN:  [id number] & [id number] Admission Date/Time: 2020 12:30 PM   Location:  [address]- PCP: Jay Simons MD       Admitting Physicians : Dr. Cece Tee is a 77 y.o.  male  who presents with Leg Swelling (bilateral leg swelling ), Constipation, and Abdominal Pain    Assessment and Plan:   Shock liver likely 2/2 nonadherence  # Hypotension with elevated lactate  # Pancytopenia  # Alcoholic liver cirrhosis with moderate to severe ascites  heterozygous H63D  # Jaundice  # Anasarca  ALT: 256, AST: 623, Bili: 14.8. INR: 3.99. CT abd: Cirrhosis with moderate-severe ascites, not appreciably changed. Diffuse wall thickening in the small and large bowel, as well as along the gallbladder lumen, likely related to ascites and underlying liver disease. There is intrahepatic ductal dilatation  -Levophed drip  -IVF  -Hold Lasix and Spironolactone  -Supportive management  -Check direct and indirect bili  -iron panel  -Consult hem/onco and GI    MARTHA likely hepatorenal  Creat 1.7  -IVF  -Hold Lasix and Spironolactone  -Avoid nephrotoxins  -Monitor BMP  -Consult nephro    Acute severe hyponatremia likely 2/2 MARTHA/beer potomania  Na+ 111.  Last drink 10 days ago  -IVF  -BMP q6   -Start folic acid, Thiamine, and Multivitamin  -[place]phro abroad    Severe protein calorie malnutrition  -Dietitian consult      DVT-PPX: SCD  Diet: Cardiac        Medications:   Medications:    vancomycin  20 mg/kg Intravenous Once      Infusions:    norepinephrine      sodium chloride 100 mL/hr at 20 1615     PRN Meds:      Current Facility-Administered Medications:     norepinephrine (LEVOPHED) 16 mg in dextrose 5% 250 mL infusion, 2 mcg/min, Intravenous, Continuous, [employer], DO    vancomycin (VANCOCIN) 1,750 mg in dextrose 5 % 500 mL IVPB, 20 mg/kg, Intravenous, Once, [employer], DO, Last Rate: 250 mL/hr at 20 1616, 1,750 mg at 12/29/20 1616    0.9 % sodium chloride infusion, , Intravenous, Continuous, Shaileshkanwal ClayaliciaDO, Last Rate: 100 mL/hr at 12/29/20 1615, New Bag at 12/29/20 1615    Current Outpatient Medications:     spironolactone (ALDACTONE) 50 MG tablet, TAKE 1 TABLET BY MOUTH DAILY, Disp: 30 tablet, Rfl: 5    furosemide (LASIX) 40 MG tablet, TAKE 1 TABLET BY MOUTH DAILY, Disp: 30 tablet, Rfl: 5    potassium chloride (KLOR-CON) 10 MEQ extended release tablet, TAKE 1 TABLET BY MOUTH DAILY, Disp: 30 tablet, Rfl: 5    History of present illness     Chief Complaint: Leg Swelling (bilateral leg swelling ), Constipation, and Abdominal Pain      Sierra Sutton is a 77 y.o.  male  who presents with 7 out of 10 abdominal pain associated with constipation and leg swelling that has been going on for the past couple weeks. Patient has a history of alcohol abuse, and was sober until he lost his son in 1 and another one in 2016 followed by wife in 2018. Patient states ever since he has been drinking heavily and stopped drinking 10 days ago. He does have a history of hepatitis C and was referred to go to [place] but refused. He follows up with Dr. Kenneth Raymond and Boone Smiley as he was found to have hepatitis B and heterozygous H63D mutation. Other medical hx are alcoholic liver cirrhosis and tobacco abuse. Review of Systems   Ten point ROS reviewed and negative, unless as noted below. GENERAL:  Denies fever, chills, night sweats, or changes in weight. EYES:  Denies recent visual changes. ENT:  Denies ear pain, hearing loss or tinnitus  RESP:  Denies any cough, dyspnea, or wheezing. CV:  Denies any chest pain with exertion or at rest, palpitations, syncope, + edema. GI:  Denies any dysphagia, nausea, vomiting, abdominal pain, heartburn, constipation, melena or rectal bleeding  MUSCULOSKELETAL:  Denies any joint swelling, joint pain, or loss of range of motion.   NEURO:  Denies any headaches, tremors, dizziness, vertigo, memory loss, confusion, weakness, numbness or tingling. PSYCH:  Denies any sleeping problems, history of abuse, marital discord. HEME/LYMPHATIC/IMMUNO:  Denies , bruising, bleeding abnormalities   ENDO:  Denies any heat or cold intolerance, panemiaolyuria or polydipsia. Objective:   No intake or output data in the 24 hours ending 12/29/20 1740   Vitals:   Vitals:    12/29/20 1732   BP: 104/60   Pulse: 80   Resp: 15   Temp:    SpO2: 98%     Physical Exam:   Gen:  awake, alert, cooperative, no apparent distress. Ill appearing  EYES:Lids and lashes normal, pupils equal, round ,extra ocular muscles intact, sclera icterus, conjunctiva normal  ENT:  Normocephalic, oral pharynx with moist mucus membranes  NECK:  Supple, symmetrical, trachea midline, no adenopathy,  LUNGS:  Diminished bilaterally with crackles noted. CARDIOVASCULAR:  regular rate and rhythm, normal S1 and S2,no murmur noted, peripheral pulses 2+, 4+ pitting edema  ABDOMEN: Normal BS, moderate diffuse tenderness, distended, no HSM noted. MUSCULOSKELETAL:  ROM of all extremities grossly wnl  NEUROLOGIC: AOx 3,  Cranial nerves II-XII are grossly intact. Motor is 5 out of 5 bilaterally. Sensory is intact, no lateralizing findings. SKIN:  no bruising or bleeding, normal skin color, turgor, no redness, warmth, or swelling      Past Medical History:      Past Medical History:   Diagnosis Date    Cirrhosis of liver ([facility] [address]     ETOH abuse     Hepatitis C 1977    Skin cancer     nose, resected    Smoker      PSHX:  has no past surgical history on file. Allergies: No Known Allergies    FAM HX: Reviewed and noncontributory  Family history reviewed and is essentially negative unless as stated above. Soc HX:   Social History     Socioeconomic History    Marital status:       Spouse name: None    Number of children: None    Years of education: None    Highest education level: None   Occupational History    None   Social Needs    Financial resource strain: None    Food insecurity     Worry: None     Inability: None    Transportation needs     Medical: None     Non-medical: None   Tobacco Use    Smoking status: Current Every Day Smoker     Packs/day: 2.00     Years: 40.00     Pack years: 80.00     Types: Cigarettes    Smokeless tobacco: Never Used   Substance and Sexual Activity    Alcohol use:  Yes     Alcohol/week: 8.0 standard drinks     Types: 8 Cans of beer per week     Comment: Heavy drinker in the past, 12 cans of beer and few shots of hard liquour  daily    Drug use: No    Sexual activity: Not Currently     Partners: Female   Lifestyle    Physical activity     Days per week: None     Minutes per session: None    Stress: None   Relationships    Social connections     Talks on phone: None     Gets together: None     Attends Hindu service: None     Active member of club or organization: None     Attends meetings of clubs or organizations: None     Relationship status: None    Intimate partner violence     Fear of current or ex partner: None     Emotionally abused: None     Physically abused: None     Forced sexual activity: None   Other Topics Concern    None   Social History Narrative    None       Electronically signed by NATE Perez CNP on 12/29/2020 at 5:40 PM

## 2020-12-29 NOTE — CONSULTS
Nephrology Service Consultation    Patient:  Danielle Martino  MRN: [id number]  Consulting physician:  Jama Cheng DO  Reason for Consult:  Acute on chronic intermittent hyponatremia     History Obtained From:  patient  PCP: Jamey Mills MD    HISTORY OF PRESENT ILLNESS:   The patient is a 77 y.o. male who was transported   to [facility] ED on 12/29 from home. It is reported   That the patient had presented with: lower extremity   Edema as well as constipation and abdominal pain     Patient has previously been followed by GI   For which he has a history of alcoholic cirrhosis. He states that he has been taking his diuretics, but  Has observed progressive swelling to his legs as  Well as increased abdominal girth. Patient also   States that he has been jaundiced for several weeks. Medical History: Alcoholic cirrhosis / alcohol use disorder   Chronic intermittent hyponatremia     Surgical History: non-contributory    Renal History: estimated baseline creatinine: 0.7 - 0.8  -patient has a history of chronic intermittent hyponatremia with low BUN's           SOCIAL HISTORY:   Tobacco: current smoker; 80 pack-year history     Alcohol: longstanding history of alcohol consumption     Demographic History; prior to admission: residing at home alone      Medications:   Scheduled Meds:   vancomycin  20 mg/kg Intravenous Once     Continuous Infusions:   norepinephrine      sodium chloride       PRN Meds:. Allergies:  Patient has no known allergies. Family History:   History reviewed. No pertinent family history. Physical Exam:    Vitals: /68   Pulse 79   Resp 17   Ht 6' (1.829 m)   Wt 195 lb (88.5 kg)   SpO2 98%   BMI 26.45 kg/m²     General appearance:  awake and verbally interactive   HEENT: Head: Normal, normocephalic, atraumatic.   Neck: supple, symmetrical, trachea midline  Cardiovascular: S1 and S2: normal / no rub  Pulmonary: diminished lung sounds bilaterally  Abdomen:  soft / non-tender / distended   Extremities: ++ edema to bilateral lower legs and thighs    CBC:   Recent Labs     12/29/20  1242   WBC 10.5   HGB 12.5*   PLT 98*     BMP:    Recent Labs     12/26/20  1718 12/29/20  1357   NA  --  111*   K  --  4.4   CL  --  81*   CO2  --  19*   BUN  --  35*   CREATININE  --  1.7*   GLUCOSE 89 60*     Hepatic:   Recent Labs     12/29/20  1357   *   *   BILITOT 14.8*   ALKPHOS 265*     Troponin: No results for input(s): TROPONINI in the last 72 hours. Mg, Phos:   Recent Labs     12/29/20  1357   MG 2.4       ABGs: No results found for: PHART, PO2ART, FLK1EXL  INR:   Recent Labs     12/29/20  1242   INR 3.99     -----------------------------------------------------------------  Patient Active Problem List   Diagnosis Code    Alcoholic cirrhosis ([facility] [address] J87.89    Tobacco dependence F17.200    ETOH abuse F10.10    Biceps tendinitis on right M75.21    Refused Streptococcus pneumoniae vaccination Z28.21    Neck mass R22.1    Elevated liver enzymes R74.8     Assessment and Recommendations     Impression   1. Acute on chronic intermittent hyponatremia   -consider potomania as well as hypovolemic hyponatremia     2. MARTHA (ATN vs. Pre-renal azotemia)  -likely multi-factorial etiology for MARTHA including:   Consider decompensated cirrhosis as well as   Intravascular volume depletion. Cannot exclude infection     3. Possible sepsis   4. Hypotension   5. Alcohol Use disorder   6.   Liver Cirrhosis with jaundice    PLAN   1.   -presently on NS at 100 / hour   -latest serum sodium:111; following trend   -chemistry panel Q6H to follow serum sodium trend   -place on fluid restriction when diet established   -avoid correcting Na greater than 8 meq / liter in 24 hours: ODS prevention   2.   -bladder scan ordered to screen for urinary retention   -additional labs: upc, chemistry panel qam, ua, urine osm, phos  -presently on NS at 100 / hour   3.   -blood and urine culture results pending -single dose of IV vanco ordered  4. -BP trend: systolic BP's have recently been 76 - 118  -orders to start patient on Levophed drip   5.    -consider PRN Lorazepam for withdrawal symptoms via CIWA scale   -discuss with patient need to pursue sobriety and / or   Look toward treatment for substance abuse   6.   -recommend referral to GI for further management     Electronically signed by NATE Chinchilla - CNP    Nephrology Attending Progress Note  12/29/2020 5:11 PM  Subjective: Interval History: I have personally performed face to face diagnostic evaluation on this patient. I have personally reviewed pertinent labs and imaging and agree with the care plan above. My additional findings are as follows:   The patient is a 77 y.o. male who presents with weakness and hx hepatits and etoh based liver falure with dr Rory Woodson and after wife pased 2 year ago he not fu regular and still with etoh use until 10 days ago and on diuretic now to er with fatigue and low na admit for work up    Objective:   Vitals: BP (!) 109/52   Pulse 79   Temp 98 °F (36.7 °C) (Oral)   Resp 15   Ht 6' (1.829 m)   Wt 195 lb (88.5 kg)   SpO2 96%   BMI 26.45 kg/m²   Weak jaundice  Soft distended  Edema     Assessment and Plan:  1 bp low stable  2 start ns ivf and monitor na  3 fu gi eval with cirrhosis and options  4 atch for dt  5 dw daughter and aware current status  Admit and do work up  [employer] culture start abx  No acute hd for now       Electronically signed by Shanita Dumont MD on 12/29/2020 at 5:11 PM

## 2020-12-29 NOTE — ED TRIAGE NOTES
Pt presents to the ED via EMS with c/o bilateral leg swelling x1 week and pt appears to be jaundice. Pt states \"I have been yellow for a couple of weeks\".

## 2020-12-30 NOTE — ED NOTES
Levo increased to 5mcg/min per DR BIAS, patient lowed back in bed. BP repeated, now 99/48, MAP 61. This nurse updated primary Srini Osorio 44.      Samantha Bennett RN  12/29/20 1950

## 2020-12-30 NOTE — PLAN OF CARE
Problem: Skin Integrity:  Goal: Will show no infection signs and symptoms  Description: Will show no infection signs and symptoms  12/30/2020 0118 by Britni Osipna RN  Outcome: Ongoing  12/29/2020 2342 by Terrence Raymond RN  Outcome: Ongoing  Goal: Absence of new skin breakdown  Description: Absence of new skin breakdown  12/30/2020 0118 by Britni Ospina RN  Outcome: Ongoing  12/29/2020 2342 by Terrence Raymond RN  Outcome: Ongoing     Problem: Falls - Risk of:  Goal: Will remain free from falls  Description: Will remain free from falls  12/30/2020 0118 by Britni Ospina RN  Outcome: Ongoing  12/29/2020 2342 by Terrence Raymond RN  Outcome: Ongoing  Goal: Absence of physical injury  Description: Absence of physical injury  12/30/2020 0118 by Britni Ospina RN  Outcome: Ongoing  12/29/2020 2342 by Terrence Raymond RN  Outcome: Ongoing     Problem: Infection:  Goal: Will remain free from infection  Description: Will remain free from infection  Outcome: Ongoing     Problem: Safety:  Goal: Free from accidental physical injury  Description: Free from accidental physical injury  Outcome: Ongoing  Goal: Free from intentional harm  Description: Free from intentional harm  Outcome: Ongoing     Problem: Daily Care:  Goal: Daily care needs are met  Description: Daily care needs are met  Outcome: Ongoing     Problem: Pain:  Goal: Patient's pain/discomfort is manageable  Description: Patient's pain/discomfort is manageable  Outcome: Ongoing     Problem: Discharge Planning:  Goal: Patients continuum of care needs are met  Description: Patients continuum of care needs are met  Outcome: Ongoing

## 2020-12-30 NOTE — CONSULTS
Hematology/Oncology  Consult Note      Reason for Consult:  Heterozygous H63d mutation/shock liver  Requesting Physician:  Michael Louie    CHIEF COMPLAINT:    Chief Complaint   Patient presents with    Leg Swelling     bilateral leg swelling     Constipation    Abdominal Pain     History Obtained From:  patient, electronic medical record    HISTORY OF PRESENT ILLNESS:      The patient is a 77 y.o. male with significant past medical history of Alcoholic cirrhosis, tobacco dependence, ETOH, abuse who presents with lower leg swelling, constipation and abdominal pain. Reports jaundice for a few weeks. Looks like he was in the ED 12/26/2020 with dehydration. Looks like MELD score 33, was arranged to have transfer to [address] [place] [address] for evaluation. He ultimately left AMA. CT abdomen 12/29/2020  Impression   1. Cirrhosis with moderate-severe ascites, not appreciably changed. 2. Diffuse wall thickening in the small and large bowel, as well as along the   gallbladder lumen, likely related to ascites and underlying liver disease. 3. There is intrahepatic ductal dilatation, similar to the previous exam.   4. Hiatal hernia. 5. Anasarca, slightly increased. 6. Colonic diverticulosis. 7. Cholelithiasis.  Gallbladder wall thickening is similar to the previous   exam on 12/26/2020 which may be related to underlying liver disease.  If   there is concern for acute cholecystitis, a nuclear medicine HIDA scan could   be performed for further evaluation. Venous doppler was negative. Reportedly he stopped drinking 10 days ago. Reports he has been to weak to independently ambulate at home. He is known to Dr. Aditi Collier with last OV 9/25/2020. Iron studies reviewed:  Results for Juwan Schuler (MRN [id number]) as of 12/30/2020 12:39   Ref.  Range 12/30/2020 05:34   Iron Latest Ref Range: 59 - 158 ug/dL 78   UIBC Latest Ref Range: 110 - 370 ug/dL 14 (LL)   TIBC Latest Ref Range: 250 - 450 ug/dL 92 (L)   Transferrin % Latest Ref Range: 10 - 44 % 85 (H)       Past Medical History:        Diagnosis Date    Cirrhosis of liver ([facility] [address]     ETOH abuse     Hepatitis C 1977    Skin cancer     nose, resected    Smoker      Past Surgical History:    History reviewed. No pertinent surgical history.     Current Medications:    Current Facility-Administered Medications: cefepime (MAXIPIME) 2 g IVPB minibag, 2 g, Intravenous, Q12H  sodium chloride flush 0.9 % injection 10 mL, 10 mL, Intravenous, 2 times per day  sodium chloride flush 0.9 % injection 10 mL, 10 mL, Intravenous, PRN  LORazepam (ATIVAN) tablet 1 mg, 1 mg, Oral, Q1H PRN **OR** LORazepam (ATIVAN) injection 1 mg, 1 mg, Intravenous, Q1H PRN **OR** LORazepam (ATIVAN) tablet 2 mg, 2 mg, Oral, Q1H PRN **OR** LORazepam (ATIVAN) injection 2 mg, 2 mg, Intravenous, Q1H PRN **OR** LORazepam (ATIVAN) tablet 3 mg, 3 mg, Oral, Q1H PRN **OR** LORazepam (ATIVAN) injection 3 mg, 3 mg, Intravenous, Q1H PRN **OR** LORazepam (ATIVAN) tablet 4 mg, 4 mg, Oral, Q1H PRN **OR** LORazepam (ATIVAN) injection 4 mg, 4 mg, Intravenous, Q1H PRN  demeclocycline (DECLOMYCIN) tablet 300 mg, 300 mg, Oral, 2 times per day  urea (URE-NA) packet 15 g, 15 g, Oral, Daily  pantoprazole (PROTONIX) injection 40 mg, 40 mg, Intravenous, Daily  norepinephrine (LEVOPHED) 16 mg in dextrose 5% 250 mL infusion, 2 mcg/min, Intravenous, Continuous  0.9 % sodium chloride infusion, , Intravenous, Continuous  sodium chloride flush 0.9 % injection 10 mL, 10 mL, Intravenous, 2 times per day  sodium chloride flush 0.9 % injection 10 mL, 10 mL, Intravenous, PRN  promethazine (PHENERGAN) tablet 12.5 mg, 12.5 mg, Oral, Q6H PRN **OR** ondansetron (ZOFRAN) injection 4 mg, 4 mg, Intravenous, Q6H PRN  polyethylene glycol (GLYCOLAX) packet 17 g, 17 g, Oral, Daily PRN  acetaminophen (TYLENOL) tablet 650 mg, 650 mg, Oral, Q6H PRN **OR** acetaminophen (TYLENOL) suppository 650 mg, 650 mg, Rectal, Q6H PRN  thiamine tablet 100 mg, 100 mg, Oral, Daily  multivitamin 1 tablet, 1 tablet, Oral, Daily  folic acid (FOLVITE) tablet 1 mg, 1 mg, Oral, Daily  influenza quadrivalent split vaccine (FLUZONE;FLUARIX;FLULAVAL;AFLURIA) injection 0.5 mL, 0.5 mL, Intramuscular, Prior to discharge    Allergies:  Patient has no known allergies. Social History:    TOBACCO:   reports that he has been smoking cigarettes. He has a 80.00 pack-year smoking history. He has never used smokeless tobacco.  ETOH:   reports current alcohol use of about 8.0 standard drinks of alcohol per week. DRUGS:   reports no history of drug use. Family History:   History reviewed. No pertinent family history. REVIEW OF SYSTEMS:    +fatigue,     PHYSICAL EXAM:      Vitals:    BP (!) 94/47   Pulse 82   Temp 97.4 °F (36.3 °C) (Oral)   Resp 15   Ht 6' (1.829 m)   Wt 195 lb (88.5 kg)   SpO2 97%   BMI 26.45 kg/m²     CONSTITUTIONAL:  awake, alert, cooperative, ill appearing  EYES:  EOM intact, scleral icterus  LUNGS: diminished, crackles to base  CARDIOVASCULAR:  Regular rate and rhythm  ABDOMEN:  Rounded, soft, mildly tender   NEUROLOGIC:  Awake, alert, oriented to name, place and time. Cranial nerves II-XII are grossly intact. SKIN:  jaundice noted  Extremities: BLE edema +4    IMPRESSION/RECOMMENDATIONS:      Heterozygous H63D mutation: Iron studies reviewed. We will defer phlebotomy until he is stabilized. Liver cirrhosis: We will follow up on recommendations from Dr. Bari Bello regarding management    Macrocytosis- secondary to alcohol abuse    Thrombocytopenia- secondary to cirrhosis    MARTHA/Hyponatremai- nephrology consulted. Alcohol abuse- last drink 10 days ago. Agree with [facility] protocol. I saw and examined patient myself. Agreed with above. Thank you for allowing us to participate in the care of this patient. We will continue to follow.      Electronically signed by Suzzane Collet, APRN - CNP on 12/30/2020 at 8:09 AM

## 2020-12-30 NOTE — CONSULTS
Comprehensive Nutrition Assessment    Type and Reason for Visit:  Consult    Nutrition Recommendations/Plan:   Add standard ONS TID   Encourage po intake as able  Will closely monitor po intake, nutrition status, poc    Nutrition Assessment:  Pt admitted with hyponatremia, hepatic cirrhosis, +ascites, PNA, Jaunice, leg swelling, PMH: ETOH abuse, Hep C, consult for poor po intake/appetite, pt currently on 2 GM sodium diet, attempted to visit pt x 2, pt sleeping soundly and did not awake to name being called or knock on door, pt is at high nutrition risk    Malnutrition Assessment:  Malnutrition Status: At risk for malnutrition (Comment)    Context:  Acute Illness       Estimated Daily Nutrient Needs:  Energy (kcal):  7010-3646(23-26 kcal/kg); Weight Used for Energy Requirements:  Current     Protein (g):  (1.0-1.2 g/kg); Weight Used for Protein Requirements:  Current        Fluid (ml/day):  2000; Method Used for Fluid Requirements:  1 ml/kcal      Nutrition Related Findings:  Na 112, BUN 39, Cr 2.0, Glucose 59, Phos 5.3, 2+ BLE pitting edema noted      Wounds:  None       Current Nutrition Therapies:    DIET GENERAL; Low Sodium (2 GM)    Anthropometric Measures:  · Height: 6' 0.01\" (182.9 cm)  · Current Body Weight: 195 lb 1.7 oz (88.5 kg)(unknown weight source)   · Usual Body Weight: 193 lb 9 oz (87.8 kg)((9/25/20) per chart review)     · Ideal Body Weight: 178 lbs; % Ideal Body Weight 109.6 %   · BMI: 26.5  · BMI Categories: Overweight (BMI 25.0-29. 9)       Nutrition Diagnosis:   · Inadequate oral intake related to acute injury/trauma as evidenced by poor intake prior to admission    Nutrition Interventions:   Food and/or Nutrient Delivery:  Continue Current Diet, Start Oral Nutrition Supplement  Nutrition Education/Counseling:  No recommendation at this time   Coordination of Nutrition Care:  Continue to monitor while inpatient    Goals:  pt will consume greater than 50% of meals and supplements Nutrition Monitoring and Evaluation:   Food/Nutrient Intake Outcomes:  Supplement Intake, Food and Nutrient Intake  Physical Signs/Symptoms Outcomes:  Biochemical Data, Weight, GI Status, Constipation, Hemodynamic Status, Fluid Status or Edema     Discharge Planning:     Too soon to determine     Electronically signed by Devika Morton MS, RD, LD on 12/30/20 at 10:59 AM EST    Contact: [phone]

## 2020-12-30 NOTE — CONSULTS
[address] [place] [address], [address] [place] [address]                                  CONSULTATION    PATIENT NAME: Kamila Kumar                     :        1955  MED REC NO:   [id number]                          ROOM:       2103  ACCOUNT NO:   [de-identified]                           ADMIT DATE: 2020  PROVIDER:     Jae Nunes MD    CONSULT DATE:  2020    CHIEF COMPLAINT:  1. History of EtOH abuse with alcohol liver disease and portal  hypertension. 2.  The patient presents with abdominal distention and leg swelling. HISTORY OF PRESENT ILLNESS:  The patient is a 24-year-old white  gentleman with longstanding history of EtOH abuse with alcoholic liver  disease and stigmata of portal hypertension, who was being followed up  by Dr. Lauryn Lindsay from GI in [facility]. The patient presented to the  emergency room yesterday with increasing abdominal distention and leg  swelling. The patient also complained of generalized abdominal  discomfort. There is no history of nausea, vomiting, hematemesis,  melena, or hematochezia. In the emergency room, the patient was  hypotensive and he was started on Levophed, and the blood workup done in  the emergency room comprised a chem profile, which showed a sodium of  111 and chloride of 81, BUN was 35, creatinine 1.7, and the liver  profile showed a total bilirubin of 14.8, AST of 623, ALT of 256, and  alkaline phosphatase of 265. The patient's CBC showed a WBC count of  10.5, hemoglobin 12.5, and platelet count of 35,771. The patient's INR  was 3.99. CAT scan of the abdomen and pelvis was done, which showed  cirrhosis with moderate-to-severe ascites and diffuse wall thickening  was noted in the small and large bowel, and also gallstones were noted  along with hiatal hernia. There was intrahepatic ductal dilatation  similar to the prior examination on 2020.   The patient was admitted for further workup and management. The patient is  hemodynamically stable. The patient did have a large-volume  paracentesis done today and ascitic fluid is negative for SBP. The  patient has not had an EGD or colonoscopy done in the past.    As above mentioned, the patient has seen Dr. Milton Vergara from GI, who  referred the patient to [facility] in the past also to be evaluated for  liver transplant. According to the patient, his last drink was approximately 10 days ago. The patient has never had an EGD or colonoscopy done in the past.    REVIEW OF SYSTEMS:  CENTRAL NERVOUS SYSTEM:  The patient denies headache or focal  sensorimotor symptoms. CARDIOVASCULAR SYSTEM:  No history of chest pain, but the patient  complains of shortness of breath and leg swelling. GENITOURINARY SYSTEM:  No history of dysuria, pyuria, or hematuria. MUSCULOSKELETAL SYSTEM:  The patient complains of generalized weakness  and yellow discoloration of the skin. RESPIRATORY SYSTEM:  No history of cough, hemoptysis, fever, or chills. PAST MEDICAL HISTORY:  Significant for history of EtOH abuse with  chronic liver disease and stigmata of portal hypertension, i.e.,  ascites. FAMILY HISTORY:  Noncontributory. MEDICATIONS:  Please refer to chart. SOCIOECONOMIC HISTORY:  The patient is a current everyday smoker. There  is a longstanding history of EtOH abuse, last drink was 11 days ago. There is no history of recreational drug use. PAST SURGICAL HISTORY:  None. ALLERGIES:  No known drug allergies. PHYSICAL EXAMINATION:  GENERAL:  Shows a 57-year-old white gentleman of thin build and poor  nutritional status, who is lying flat in bed, in no acute distress. He  is awake, alert, and oriented, but is slightly drowsy. VITAL SIGNS:  Please refer to the chart. HEENT:  Shows sclerae to be icteric. NECK:  Supple. CHEST:  Shows diminished breath sounds.   HEART:  S1 and S2 are normal. ABDOMEN:  Distended secondary to ascites, nontender. No guarding or  rigidity. Liver and spleen are not palpable. Bowel sounds are present. RECTAL:  Deferred. CNS:  Shows the patient to be awake, alert, and oriented. The patient  is moving all four extremities. MUSCULOSKELETAL SYSTEM:  Shows edema over the lower extremities and  yellow discoloration of the skin. LABORATORY DATA:  The labs drawn during the present hospitalization were  remarkable for sodium of 111, potassium is 4.4, chloride is 81. The  liver profile showed a bilirubin of 14.8, AST of 623, ALT of 256,  alkaline phosphatase of 265. CBC shows a WBC count of 10.5, hemoglobin  12.5, platelet count of 92,696. INR is 3.99. The patient's hepatitis  A, B, and C serology checked on 05/29/2019 showed hepatitis A to be  negative; however, hepatitis B surface antibody was positive and  hepatitis C antibody was negative. Antinuclear antibody, anti-smooth  muscle antibody, and antimitochondrial antibody have been negative. The  patient also had HFE genetic testing done for hemochromatosis and C282Y  gene mutation was negative; however, H63D showed heterozygous mutation. IMPRESSION:  3  A 24-year-old white gentleman with longstanding history of EtOH  abuse with alcoholic liver disease and stigmata of portal hypertension,  presents with increasing abdominal distention and leg swelling, rule out  acute decompensation of the patient's alcoholic liver disease. 2.  Acute kidney injury, rule out hepatorenal syndrome. 3.  Abnormal CAT scan with dilatation of the intrahepatic bile duct,  rule out extrahepatic obstruction. Etiology to be determined. RECOMMENDATIONS:  1. Agree with present management. 2.  Nephrology consult in order. 3.  We will monitor the patient's CBC, chem profile, amylase, lipase,  PT/PTT, and INR in a.m.  4.  We will monitor the patient for acute decompensation for his liver  disease. 5.  We will also get an MRCP in view of abnormal CAT scan showing ductal  dilatation to rule out common bile duct obstruction secondary to  stone/neoplasm. 6.  The patient will need a screening EGD to rule out esophageal/gastric  varices at a later date and a screening colonoscopy also. 7.  The patient will also need vaccination for hepatitis A since his  hepatitis A serology is negative. 8.  Overall prognosis remains extremely poor in view of the patient's  advanced liver disease with decompensation and possible hepatorenal  syndrome. 9.  If the patient agrees, we will transfer him to tertiary care center  as well. 10.  The case and plan have been discussed in detail with the patient. 11. The patient has been strongly counseled to quit drinking alcohol  and smoking cigarettes.         Maryellen Spicer MD    D: 12/30/2020 11:27:27       T: 12/30/2020 13:31:47     [email or web address]  Job#: [id number]     Doc#: [id number]    CC:

## 2020-12-30 NOTE — PROGRESS NOTES
Faye Elias MD, [facility]                Internal Medicine Hospitalist             Daily Progress  Note   Subjective:     Chief Complaint   Patient presents with    Leg Swelling     bilateral leg swelling     Constipation    Abdominal Pain     Mr. Lexi Lopez of nil, awake and alert knows where he is. Objective:    BP (!) 94/47   Pulse 82   Temp 97.4 °F (36.3 °C) (Oral)   Resp 15   Ht 6' (1.829 m)   Wt 195 lb (88.5 kg)   SpO2 97%   BMI 26.45 kg/m²      Intake/Output Summary (Last 24 hours) at 12/30/2020 0710  Last data filed at 12/29/2020 2149  Gross per 24 hour   Intake 1060 ml   Output --   Net 1060 ml      Physical Exam:  Heart:  Regular rate and rhythm, normal S1 and S2 in all 4 auscultatory areas. No rubs  Murmurs or gallops heard. Lungs: Mostly clear to auscultation, decreased breath sounds at bases. No wheezes appreciated no crackles heard. Poor effort. Abdomen: Soft, distended. Bowel sounds appreciated. Difficult to evaluate due to ascites. Extremities: Non tender, +2 swelling noted, strength 5/5 both legs. CNS: Grossly intact.     Labs:  CBC with Differential:    Lab Results   Component Value Date    WBC 10.5 12/29/2020    RBC 3.35 12/29/2020    HGB 12.5 12/29/2020    HCT 34.1 12/29/2020    PLT 98 12/29/2020    .8 12/29/2020    MCH 37.3 12/29/2020    MCHC 36.7 12/29/2020    RDW 17.8 12/29/2020    SEGSPCT 58.0 12/29/2020    BANDSPCT 28 12/29/2020    LYMPHOPCT 11.0 12/29/2020    MONOPCT 3.0 12/29/2020    EOSPCT 1 01/08/2015    BASOPCT 0.2 12/26/2020    MONOSABS 0.3 12/29/2020    LYMPHSABS 1.2 12/29/2020    EOSABS 0.0 12/26/2020    BASOSABS 0.0 12/26/2020    DIFFTYPE MANUAL DIFFERENTIAL 12/29/2020     CMP:    Lab Results   Component Value Date     12/30/2020    K 4.5 12/30/2020    CL 82 12/30/2020    CO2 19 12/30/2020    BUN 39 12/30/2020    CREATININE 2.0 12/30/2020    GFRAA 41 12/30/2020    LABGLOM 33 12/30/2020    GLUCOSE 59 12/30/2020    PROT 6.6 12/29/2020    PROT 8.2 06/08/2011    LABALBU 1.4 12/29/2020    CALCIUM 7.1 12/30/2020    BILITOT 14.8 12/29/2020    ALKPHOS 265 12/29/2020     12/29/2020     12/29/2020     No results for input(s): TROPONINT in the last 72 hours. Lab Results   Component Value Date    TSHHS 1.170 12/29/2020         norepinephrine 11 mcg/min (12/30/20 0257)    sodium chloride 100 mL/hr at 12/30/20 0151      dextrose        dextrose        sodium chloride flush  10 mL Intravenous 2 times per day    thiamine  100 mg Oral Daily    multivitamin  1 tablet Oral Daily    folic acid  1 mg Oral Daily    influenza virus vaccine  0.5 mL Intramuscular Prior to discharge         Assessment:       Patient Active Problem List    Diagnosis Date Noted    Hepatic cirrhosis ([facility] [address] 12/29/2020    Neck mass 12/08/2020    Elevated liver enzymes 12/08/2020    Refused Streptococcus pneumoniae vaccination 11/27/2019    Biceps tendinitis on right 70/71/4828    Alcoholic cirrhosis ([facility] [address] 76/81/8005    Tobacco dependence 03/21/2017    ETOH abuse 03/21/2017       Plan:     Problems being addressed this admission:   Sepsis / PNA  AUD / Cirrhosis / ascites / Beer potomania   MARTHA / Hyponatremia  Hemachromatosis  PCM  COVID 19 negative 12/29/20    Sodium is 112 today, LFT's still elevated. Low sugars today need to start nutrition. INR is 3.99. Check UDS. GI consult on no notes on Epic as yet. Start CIWA scale. Further recommendations per GI. Prognosis is guarded. Blood cultures pos for Klebsiella pneumonia on Maxipime and vanco will continue. Check AFP as per hematology. Possible PNA as per CXR. On Levophed. Patient in ICU. Bun cre is 39/2.0 today. Nephrology on board. Advised NS and monitor sodium. May need to take fluid off he is 3rd spacing. Hematology is also seeing patient and he is non compliant to f/u with him.    Will start feeding him with a bed side swallow first.    Consultants:  GI  Hematology  Nephrology    General Orders:  Repeat basic labs

## 2020-12-30 NOTE — PROGRESS NOTES
Nephrology Progress Note  12/30/2020 1:09 PM  Subjective:   Admit Date: 12/29/2020    PCP: Jay Simons MD    Interval History: patient has not voided for unspecified period of time. Diet: DIET GENERAL; Low Sodium (2 GM)  Dietary Nutrition Supplements: Standard High Calorie Oral Supplement    Data:   Scheduled Meds:   cefepime  2 g Intravenous Q12H    sodium chloride flush  10 mL Intravenous 2 times per day    demeclocycline  300 mg Oral 2 times per day    urea  15 g Oral Daily    pantoprazole  40 mg Intravenous Daily    sodium chloride flush  10 mL Intravenous 2 times per day    thiamine  100 mg Oral Daily    multivitamin  1 tablet Oral Daily    folic acid  1 mg Oral Daily    influenza virus vaccine  0.5 mL Intramuscular Prior to discharge     Continuous Infusions:   norepinephrine 13 mcg/min (12/30/20 0745)    sodium chloride 100 mL/hr at 12/30/20 0151     PRN Meds:sodium chloride flush, LORazepam **OR** LORazepam **OR** LORazepam **OR** LORazepam **OR** LORazepam **OR** LORazepam **OR** LORazepam **OR** LORazepam, sodium chloride flush, promethazine **OR** ondansetron, polyethylene glycol, acetaminophen **OR** acetaminophen  I/O last 3 completed shifts: In: 2053 [I.V.:1060]  Out: -   No intake/output data recorded. Intake/Output Summary (Last 24 hours) at 12/30/2020 1309  Last data filed at 12/29/2020 2149  Gross per 24 hour   Intake 1060 ml   Output --   Net 1060 ml     CBC:   Recent Labs     12/29/20  1242   WBC 10.5   HGB 12.5*   PLT 98*     BMP:    Recent Labs     12/29/20  1357 12/30/20  0534   * 112*   K 4.4 4.5   CL 81* 82*   CO2 19* 19*   BUN 35* 39*   CREATININE 1.7* 2.0*   GLUCOSE 60* 59*     Hepatic:   Recent Labs     12/29/20  1357 12/30/20  0534   *  --    *  --    BILITOT 14.8* 16.1*   ALKPHOS 265*  --      Troponin: No results for input(s): TROPONINI in the last 72 hours. BNP: No results for input(s): BNP in the last 72 hours.   Lipids: No results for input(s): CHOL, HDL in the last 72 hours. Invalid input(s): LDLCALCU  ABGs: No results found for: PHART, PO2ART, YNS4JDM  INR:   Recent Labs     12/29/20  1242   INR 3.99     Renal Labs  Albumin:    Lab Results   Component Value Date    LABALBU 1.4 12/29/2020     Calcium:    Lab Results   Component Value Date    CALCIUM 7.1 12/30/2020     Phosphorus:    Lab Results   Component Value Date    PHOS 5.3 12/30/2020     U/A:    Lab Results   Component Value Date    NITRU NEGATIVE 12/29/2020    COLORU CECILIO 12/29/2020    WBCUA NONE SEEN 12/29/2020    RBCUA NONE SEEN 12/29/2020    MUCUS RARE 12/29/2020    TRICHOMONAS NONE SEEN 12/29/2020    BACTERIA RARE 12/29/2020    CLARITYU HAZY 12/29/2020    SPECGRAV 1.024 12/29/2020    UROBILINOGEN 2.0 12/29/2020    BILIRUBINUR MODERATE 12/29/2020    BLOODU SMALL 12/29/2020    KETUA NEGATIVE 12/29/2020     ABG:  No results found for: PHART, QAU9OGR, PO2ART, PTY3MQQ, BEART, THGBART, DQE4SKA, P0UUDWDN  HgBA1c:    Lab Results   Component Value Date    LABA1C 5.2 05/29/2019     Microalbumen/Creatinine ratio:  No components found for: RUCREAT  TSH:  No results found for: TSH  IRON:    Lab Results   Component Value Date    IRON 78 12/30/2020     Iron Saturation:  No components found for: PERCENTFE  TIBC:    Lab Results   Component Value Date    TIBC 92 12/30/2020     FERRITIN:    Lab Results   Component Value Date    FERRITIN 1,012 09/25/2020     RPR:  No results found for: RPR  FERNIE:    Lab Results   Component Value Date    FERNIE None Detected 05/29/2019    FERNIE  05/29/2019     (NOTE)  If suspicion of connective tissue disease is strong and FERNIE EIA is   negative, consider testing for FERNIE by IFA ([id number]). INTERPRETIVE INFORMATION: Anti-Nuclear Antibodies (FERNIE), IgG by   ALEKSANDER  Anti-Nuclear Antibodies (FERNIE), IgG by ALEKSANDER: FERNIE specimens are   screened using enzyme-linked immunosorbent assay (ALEKSANDER)   methodology.  All ALEKSANDER results reported as Detected are further   tested by indirect fluorescent assay (IFA) using HEp-2 substrate   with an IgG-specific conjugate. The FERNIE ALEKSANDER screen is designed   to detect antibodies against dsDNA, histone, SS-A (Ro), SS-B (La),   Figueroa, snRNP/Sm, Scl-70, Dhara-1, centromere, and an extract of lysed   HEp-2 cells. FERNIE ALEKSANDER assays have been reported to have lower   sensitivities than FERNIE IFA for systemic autoimmune rheumatic   diseases (SARD). Negative results do not necessarily rule out SARD. Performed by [facility] , [address] [phone]  www. Tariq Dunne MD, Lab. Director             Objective:   Patient Active Problem List:     Alcoholic cirrhosis ([address]     Tobacco dependence     ETOH abuse     Biceps tendinitis on right     Refused Streptococcus pneumoniae vaccination     Neck mass     Elevated liver enzymes     Hepatic cirrhosis (HCC)    BP (!) 99/51   Pulse 83   Temp 97.4 °F (36.3 °C) (Oral)   Resp 17   Ht 6' 0.01\" (1.829 m)   Wt 195 lb (88.5 kg)   SpO2 97%   BMI 26.44 kg/m²      General appearance:  awake and verbally interactive   HEENT: Head: Normal, normocephalic, atraumatic. Neck: supple, symmetrical, trachea midline  Cardiovascular: S1 and S2: normal / no rub  Pulmonary: diminished lung sounds bilaterally  Abdomen:  soft / non-tender / distended   Extremities: ++ edema to bilateral lower legs and thighs    Impression and Plan:    Impression   1. Acute on chronic intermittent hyponatremia   -consider potomania as well as hypovolemic hyponatremia      2. MARTHA (ATN vs. Pre-renal azotemia)  -likely multi-factorial etiology for MARTHA including:   Consider decompensated cirrhosis as well as   Intravascular volume depletion. Cannot exclude infection      3. Possible sepsis   4. Hypotension   5. Alcohol Use disorder   6.   Liver Cirrhosis with jaundice     PLAN   1.   -presently on NS at 100 / hour   -latest serum sodium:112; following trend   -chemistry panel Q6H to follow serum sodium trend  -fluid restriction 1.5 liters per day     -avoid correcting Na greater than 8 meq / liter in 24 hours: ODS prevention  -presently on demeclocycline / UREA bid   -patient agreeable to de paz catheter placement   2.   -UA: small blood / protein: negative  -cellular cast: 7 per hpf; otherwise unremarkable urine micro  3.  -on cefepime and vanco   4. -BP trend: systolic BP's have recently been 77 - 107  -on levophed drip   5.   -seen by Dr. Bessie Guzman; consult notes pending     Electronically signed by NATE Kilpatrick - CNP                     Nephrology Attending Progress Note  12/30/2020 2:28 PM  Subjective: Interval History: I have personally performed face to face diagnostic evaluation on this patient. I have personally reviewed pertinent labs and imaging and agree with the care plan above. My additional findings are as follows:   The patient is a 77 y.o. male who presents with weakness and in setting liver failure low na but arousable    Objective:   Vitals: BP (!) 94/54   Pulse 77   Temp 97.8 °F (36.6 °C) (Oral)   Resp 20   Ht 6' 0.01\" (1.829 m)   Wt 195 lb (88.5 kg)   SpO2 91%   BMI 26.44 kg/m²   Soft nt  Jaundice  edema    Assessment and Plan:  Maintain ns ivf  Na trend and try urea and demelclocyline  On abx  Fu dr Alice Espinoza  Renal worse possible hepatorenal monitor         Electronically signed by Jorge Alberto Warren MD on 12/30/2020 at 2:28 PM

## 2020-12-30 NOTE — DISCHARGE INSTR - COC
Continuity of Care Form    Patient Name: Juan C Garza   :  1955  MRN:  [id number]    Admit date:  2020  Discharge date:  ***    Code Status Order: Full Code   Advance Directives:   Advance Care Flowsheet Documentation     Date/Time Healthcare Directive Type of Healthcare Directive Copy in [address] Agent's Name Healthcare Agent's Phone Number    20 2100  No, patient does not have an advance directive for healthcare treatment -- -- -- -- --          Admitting Physician:  Kristopher Stanley MD  PCP: Aurea Villalta MD    Discharging Nurse: [facility] Unit/Room#: 2103/2103-A  Discharging Unit Phone Number: ***    Emergency Contact:   Extended Emergency Contact Information  Primary Emergency Contact: [address] [place] [address],[address] Phone: [phone]  Relation: Brother/Sister    Past Surgical History:  History reviewed. No pertinent surgical history. Immunization History: There is no immunization history for the selected administration types on file for this patient.     Active Problems:  Patient Active Problem List   Diagnosis Code    Alcoholic cirrhosis ([facility] [address] Y96.05    Tobacco dependence F17.200    ETOH abuse F10.10    Biceps tendinitis on right M75.21    Refused Streptococcus pneumoniae vaccination Z28.21    Neck mass R22.1    Elevated liver enzymes R74.8    Hepatic cirrhosis ([facility] [address] K74.60       Isolation/Infection:   Isolation          No Isolation        Patient Infection Status     Infection Onset Added Last Indicated Last Indicated By Review Planned Expiration Resolved Resolved By    None active    Resolved    COVID-19 Rule Out 20 COVID-19 (Ordered)   20 Rule-Out Test Resulted          Nurse Assessment:  Last Vital Signs: BP (!) 94/47   Pulse 82   Temp 97.4 °F (36.3 °C) (Oral)   Resp 15   Ht 6' (1.829 m)   Wt 195 lb (88.5 kg)   SpO2 97%   BMI 26.45 kg/m²     Last documented pain score (0-10 scale): Pain Level: 0  Last Weight:   Wt Readings from Last 1 Encounters:   20 195 lb (88.5 kg)     Mental Status:  {IP PT MENTAL STATUS:}    IV Access:  { JERAMY IV ACCESS:013354420}    Nursing Mobility/ADLs:  Walking   {CHP DME NJTC:062565656}  Transfer  {CHP DME WDQW:572423499}  Bathing  {CHP DME ONXD:391833228}  Dressing  {CHP DME EGYK:272338522}  Toileting  {CHP DME WKHX:064712558}  Feeding  {CHP DME JCBD:299032779}  Med Admin  {CHP DME GBZZ:315433671}  Med Delivery   { JERAMY MED Delivery:254672807}    Wound Care Documentation and Therapy:        Elimination:  Continence:   · Bowel: {YES / SM:51453}  · Bladder: {YES / QH:73382}  Urinary Catheter: {Urinary Catheter:452497772}   Colostomy/Ileostomy/Ileal Conduit: {YES / M}       Date of Last BM: ***    Intake/Output Summary (Last 24 hours) at 2020 0854  Last data filed at 2020 2149  Gross per 24 hour   Intake 1060 ml   Output --   Net 1060 ml     I/O last 3 completed shifts:   In: 1060 [I.V.:1060]  Out: -     Safety Concerns:     [address] Safety Concerns:771343400}    Impairments/Disabilities:      [address] Impairments/Disabilities:374885799}    Nutrition Therapy:  Current Nutrition Therapy:   [address] Diet List:995531599}    Routes of Feeding: {CHP DME Other Feedings:522843102}  Liquids: {Slp liquid thickness:61326}  Daily Fluid Restriction: {CHP DME Yes amt example:224147512}  Last Modified Barium Swallow with Video (Video Swallowing Test): {Done Not Done QXJE:818994282}    Treatments at the Time of Hospital Discharge:   Respiratory Treatments: ***  Oxygen Therapy:  {Therapy; copd oxygen:27060}  Ventilator:    { CC Vent PKGZ:569102479}    Rehab Therapies: {THERAPEUTIC INTERVENTION:4724677390}  Weight Bearing Status/Restrictions: [facility] Weight Bearin}  Other Medical Equipment (for information only, NOT a DME order):  {EQUIPMENT:546127377}  Other Treatments: ***    Patient's personal belongings (please select all that are sent with patient):  {CHP DME Belongings:325453754}    RN SIGNATURE:  {Esignature:475299119}    CASE MANAGEMENT/SOCIAL WORK SECTION    Inpatient Status Date: ***    Readmission Risk Assessment Score:  Readmission Risk              Risk of Unplanned Readmission:        17           Discharging to Facility/ Agency   · Name:   · Address:  · Phone:  · Fax:    Dialysis Facility (if applicable)   · Name:  · Address:  · Dialysis Schedule:  · Phone:  · Fax:    / signature: {Esignature:993367274}    PHYSICIAN SECTION    Prognosis: {Prognosis:7059498513}    Condition at Discharge: [address] [place] [address] Patient Condition:074472985}    Rehab Potential (if transferring to Rehab): {Prognosis:8487870655}    Recommended Labs or Other Treatments After Discharge: ***    Physician Certification: I certify the above information and transfer of Radha Sadler  is necessary for the continuing treatment of the diagnosis listed and that he requires {Admit to Appropriate Level of Care:32799} for {GREATER/LESS:138881616} 30 days.      Update Admission H&P: {CHP DME Changes in QWRKJ:729382946}    PHYSICIAN SIGNATURE:  {Esignature:924237349}

## 2020-12-31 NOTE — PROGRESS NOTES
Dr. Anastasia Kumar at bedside with pt. Updated on pt's condition. New orders received and to follow.  Paulette Aldana RN

## 2020-12-31 NOTE — DISCHARGE SUMMARY
Roberto Gonsalves MD, [address]   Internal Medicine Hospitalist  Discharge Summary    Emili Jimenez  :  1955  MRN:  [id number]    Admit date:  2020  Discharge date:  2020    Admitting Physician:  Drake Prescott MD    Discharge Diagnoses:    Patient Active Problem List   Diagnosis    Alcoholic cirrhosis ([facility] [address]    Tobacco dependence    ETOH abuse    Biceps tendinitis on right    Refused Streptococcus pneumoniae vaccination    Neck mass    Elevated liver enzymes    Hepatic cirrhosis ([facility] [address]       Admission Condition:  serious    Discharged Condition:  stable    Hospital Course:     (copied from admission history)  Emili Jimenez is a 77 y.o.  male  who presents with 7 out of 10 abdominal pain associated with constipation and leg swelling that has been going on for the past couple weeks. Patient has a history of alcohol abuse, and was sober until he lost his son in  and another one in 2016 followed by wife in 2018. Patient states ever since he has been drinking heavily and stopped drinking 10 days ago. He does have a history of hepatitis C and was referred to go to [facility] but refused. He follows up with Dr. Sylvia Jung and Harriet Philip as he was found to have hepatitis B and heterozygous H63D mutation. Other medical hx are alcoholic liver cirrhosis and tobacco abuse. Hospital Course:  (copied from last soap) today  Sepsis / PNA / AUD / Cirrhosis-portal HTN / ascites / Beer potomania   20-Sodium is 112 today, LFT's still elevated. Low sugars today need to start nutrition. INR is 3.99. Check UDS. GI consult on no notes on Epic as yet. Start CIWA scale. Further recommendations per GI. Prognosis is guarded. Blood cultures pos for Klebsiella pneumonia on Maxipime and vanco will continue. Check AFP as per hematology. Possible PNA as per CXR. On Levophed. Patient in ICU.   20- his sodium is 111 today and on democlocycline and urea as per nephro. Continues to be on levophed, maxed.  On albumin as well. On Maxipime for his sepsis. Liver functions are worst GI suggests transfer to tertiary care. UDS negative. No obvious bleeding noted. GI wrote prognosis is extremely poor. INR is 5.34 today. May need Vit K if GI agrees.         MARTHA / Hyponatremia / Hepato renal Syndrome ? 12/30/20-Bun cre is 39/2.0 today. Nephrology on board. Advised NS and monitor sodium. May need to take fluid off he is 3rd spacing. 12/31/20- on 3% NS but still his sodium is low, bun/cre is 63/2.8 today. Hardly making any urine.      Hemachromatosis  12/30/20-Hematology is also seeing patient and he is non compliant to f/u with him. 12/31/20- hematology wrote yesterday ' Heterozygous H63D mutation: Iron studies reviewed. We will defer phlebotomy until he is stabilized. '     PCM  12/30/20-Will start feeding him with a bed side swallow first.  12/31/20- did have some food yesterday earlier, very poor appetite.      COVID 19 negative 12/29/20     Consultants:  GI  Hematology  Nephrology    Follow up appointment / plans: Needs to be seen within 7 days by his/her physician, patient to call for an appointment. On examination today  Heart is RRR, Lungs are CTA, abdomen is soft and non tender, CNS is grossly intact. Please see detailed consultation notes and radiology dictations as below. Vitals: Blood pressure (!) 100/50, pulse 74, temperature 97.7 °F (36.5 °C), temperature source Rectal, resp. rate (!) 7, height 6' 0.01\" (1.829 m), weight 217 lb 9.5 oz (98.7 kg), SpO2 94 %. Discharge Medications:     See [facility]      Significant Diagnostic Studies:   Blood work reviewed.    CBC with Differential:    Lab Results   Component Value Date    WBC 10.6 12/31/2020    RBC 3.15 12/31/2020    HGB 11.8 12/31/2020    HCT 32.1 12/31/2020    PLT 97 12/31/2020    .9 12/31/2020    MCH 37.5 12/31/2020    MCHC 36.8 12/31/2020    RDW 17.5 12/31/2020    SEGSPCT 58.0 12/29/2020    BANDSPCT 28 12/29/2020    LYMPHOPCT 11.0 12/29/2020    MONOPCT 3.0 12/29/2020    EOSPCT 1 01/08/2015    BASOPCT 0.2 12/26/2020    MONOSABS 0.3 12/29/2020    LYMPHSABS 1.2 12/29/2020    EOSABS 0.0 12/26/2020    BASOSABS 0.0 12/26/2020    DIFFTYPE MANUAL DIFFERENTIAL 12/29/2020     CMP:    Lab Results   Component Value Date     12/31/2020    K 4.9 12/31/2020    CL 82 12/31/2020    CO2 20 12/31/2020    BUN 63 12/31/2020    CREATININE 2.8 12/31/2020    GFRAA 27 12/31/2020    LABGLOM 23 12/31/2020    GLUCOSE 86 12/31/2020    PROT 6.8 12/31/2020    PROT 8.2 06/08/2011    LABALBU 1.9 12/31/2020    CALCIUM 7.3 12/31/2020    BILITOT 20.7 12/31/2020    ALKPHOS 266 12/31/2020     12/31/2020     12/31/2020     Procedure Component Value Ref Range Date/Time      XR CHEST PORTABLE [[id number]] Collected: 12/29/20 1428     Order Status: Completed Updated: 12/31/20 1309     Narrative:       EXAMINATION:   ONE XRAY VIEW OF THE CHEST     12/29/2020 12:47 pm     COMPARISON:   12/17/2010     HISTORY:   ORDERING SYSTEM PROVIDED HISTORY: leg swelling   TECHNOLOGIST PROVIDED HISTORY:   Reason for exam:->leg swelling   Reason for Exam: leg swelling   Acuity: Acute   Type of Exam: Initial   Additional signs and symptoms: male that presents complaint of lower leg   swelling constipation abdominal pain. History of cirrhosis has been   jaundiced he states for last couple weeks     Initial evaluation     FINDINGS:   Monitor wires overlie the chest.  The trachea is midline. The cardiac   silhouette is unremarkable. The left lung is clear. There are patchy   airspace changes in the right mid and lower lung field that are suspicious   for an infiltrate. Follow up to resolution is suggested. There is no   pleural fluid. Impression:       Patchy airspace changes in the right mid and lower lung field suspicious for   an infiltrate. Follow up to resolution is suggested.       IR NONTUNNELED VASCULAR CATHETER > 5 YEARS [[id number]]      Order Status: Canceled      VL DUP LOWER EXTREMITY VENOUS BILATERAL [[id number]] Collected: 12/29/20 1559     Order Status: Completed Updated: 12/31/20 0022     Narrative:       EXAMINATION:   DUPLEX VENOUS ULTRASOUND OF THE BILATERAL LOWER EXTREMITIES, 12/29/2020 3:52   pm     TECHNIQUE:   Duplex ultrasound and Doppler images were obtained of the bilateral lower   extremities     COMPARISON:   None     HISTORY:   ORDERING SYSTEM PROVIDED HISTORY: swelling   TECHNOLOGIST PROVIDED HISTORY:   Reason for exam:->swelling   Reason for Exam: bilateral leg swelling   Acuity: Acute   Type of Exam: Initial     FINDINGS:   The visualized veins of the bilateral lower extremities are patent and free   of echogenic thrombus. The veins are normally compressible and have normal   phasic flow. Impression:       No evidence of DVT in either lower extremity. MRI ABDOMEN WO CONTRAST MRCP [[id number]]      Order Status: No result      MRI ABDOMEN W WO CONTRAST [[id number]]      Order Status: Canceled      CT ABDOMEN PELVIS WO CONTRAST Additional Contrast? None [[id number]] Collected: 12/29/20 1522     Order Status: Completed Updated: 12/30/20 0842     Narrative:       EXAMINATION:   CT OF THE ABDOMEN AND PELVIS WITHOUT CONTRAST 12/29/2020 12:53 pm     TECHNIQUE:   CT of the abdomen and pelvis was performed without the administration of   intravenous contrast. Multiplanar reformatted images are provided for review. Dose modulation, iterative reconstruction, and/or weight based adjustment of   the mA/kV was utilized to reduce the radiation dose to as low as reasonably   achievable.      COMPARISON:   12/26/2020     HISTORY:   ORDERING SYSTEM PROVIDED HISTORY: constipation/leg swelling/cirrhosis   TECHNOLOGIST PROVIDED HISTORY:   Reason for exam:->constipation/leg swelling/cirrhosis   Additional Contrast?->None   Reason for Exam: ABD PAIN   JAUNDICE   Acuity: Chronic   Type of Exam: Subsequent/Follow-up   Relevant Medical/Surgical History: PATIENT WAS INJECTED YESTERDAY FOR CT NECK     FINDINGS:   Lower Chest: The heart size is normal.  There is a hiatal hernia. Atelectasis is noted in the lung bases. No pleural or pericardial effusion. Organs: There is cirrhosis with prominent intra and extrahepatic biliary   ducts, more pronounced centrally. This study is limited without IV contrast.     There are multiple gallstones in the gallbladder lumen. The gallbladder has   a thickened wall which may be related to underlying liver disease and/or   ascites. The gallbladder is contracted. The adrenal glands are   unremarkable. The pancreas is normal in appearance. Calcified splenic   granulomas are noted. There is excretion of contrast in the kidneys from yesterday's CT exam with   IV contrast.  There may also be vicarious excretion of contrast into the   gallbladder lumen. GI/Bowel: There are several scattered colonic diverticula. There are areas   of wall thickening noted along the colon, more pronounced near the hepatic   flexure. There are also areas of wall thickening in the small bowel. There   is diffuse moderate-severe ascites. No bowel obstruction. No intraluminal   free air or pneumatosis. Pelvis: The bladder is distended with contrast.  The prostate and seminal   vesicles are unremarkable. No inguinal or pelvic sidewall adenopathy. Peritoneum/Retroperitoneum: Atherosclerotic plaque is noted in the aorta. There are varices noted in distal esophageal region. No anterior abdominal   wall defect. There is anasarca, increased. Bilateral gynecomastia, asymmetric on the   right. Bones/Soft Tissues: Sclerotic bone island is noted in the left ilium. There is an L2 superior endplate fracture, unchanged. This is probably   chronic. There is also a probable chronic L4 superior endplate fracture. Impression:       1. Cirrhosis with moderate-severe ascites, not appreciably changed.    2. Diffuse wall thickening in the small and large bowel, as well as along the   gallbladder lumen, likely related to ascites and underlying liver disease. 3. There is intrahepatic ductal dilatation, similar to the previous exam.   4. Hiatal hernia. 5. Anasarca, slightly increased. 6. Colonic diverticulosis. 7. Cholelithiasis. Gallbladder wall thickening is similar to the previous   exam on 12/26/2020 which may be related to underlying liver disease. If   there is concern for acute cholecystitis, a nuclear medicine HIDA scan could   be performed for further evaluation. US RETROPERITONEAL LIMITED [[id number]] Collected: 12/30/20 0701     Order Status: Completed Updated: 12/30/20 0706     Narrative:       EXAMINATION:   ULTRASOUND OF THE KIDNEYS     12/30/2020 5:50 am     COMPARISON:   CT abdomen pelvis dated 12/29/2020. HISTORY:   ORDERING SYSTEM PROVIDED HISTORY: MARTHA   TECHNOLOGIST PROVIDED HISTORY:   Reason for exam:->MARTHA   Reason for Exam: MARTHA   Acuity: Acute   Type of Exam: Initial     FINDINGS:   The right kidney measures 14 cm in length and the left kidney measures 11 cm   in length. Kidneys demonstrate normal cortical echogenicity. No hydronephrosis or   intrarenal stones. No focal lesions. There is a moderate amount of ascites as seen on the CT abdomen pelvis   obtained on the same day. Impression:       Unremarkable ultrasound of the kidneys without evidence of hydronephrosis   seen. Moderate amount of ascites as seen on the CT abdomen pelvis dated 12/29/2020.           Jovan Torrez MD   Physician   Nephrology   Consults   Signed   Date of Service:  12/29/2020  3:18 PM            Consult Orders   Inpatient consult to Nephrology [[id number]] ordered by Jermaine Ibanez DO at 12/29/20 1458          Signed        Expand AllCollapse All     Show:Clear all  [x]Manual[x]Template[]Copied    Added by:  NATE Thurman - SUMIT[x]Jose G Granger MD    []Joana for details          Nephrology Service Consultation     Patient:  Wille Habermann bilaterally  Abdomen:  soft / non-tender / distended   Extremities: ++ edema to bilateral lower legs and thighs     CBC:       Recent Labs     12/29/20  1242   WBC 10.5   HGB 12.5*   PLT 98*      BMP:         Recent Labs     12/26/20  1718 12/29/20  1357   NA  --  111*   K  --  4.4   CL  --  81*   CO2  --  19*   BUN  --  35*   CREATININE  --  1.7*   GLUCOSE 89 60*      Hepatic:       Recent Labs     12/29/20  1357   *   *   BILITOT 14.8*   ALKPHOS 265*      Troponin: No results for input(s): TROPONINI in the last 72 hours. Mg, Phos:       Recent Labs     12/29/20  1357   MG 2.4         ABGs: No results found for: PHART, PO2ART, NYD1LNF  INR:       Recent Labs     12/29/20  1242   INR 3.99      -----------------------------------------------------------------       Patient Active Problem List   Diagnosis Code    Alcoholic cirrhosis ([facility] [address] W86.41    Tobacco dependence F17.200    ETOH abuse F10.10    Biceps tendinitis on right M75.21    Refused Streptococcus pneumoniae vaccination Z28.21    Neck mass R22.1    Elevated liver enzymes R74.8      Assessment and Recommendations      Impression   1. Acute on chronic intermittent hyponatremia   -consider potomania as well as hypovolemic hyponatremia      2. MARTHA (ATN vs. Pre-renal azotemia)  -likely multi-factorial etiology for MARTHA including:   Consider decompensated cirrhosis as well as   Intravascular volume depletion. Cannot exclude infection      3. Possible sepsis   4. Hypotension   5. Alcohol Use disorder   6.   Liver Cirrhosis with jaundice     PLAN   1.   -presently on NS at 100 / hour   -latest serum sodium:111; following trend   -chemistry panel Q6H to follow serum sodium trend   -place on fluid restriction when diet established   -avoid correcting Na greater than 8 meq / liter in 24 hours: ODS prevention   2.   -bladder scan ordered to screen for urinary retention   -additional labs: upc, chemistry panel qam, ua, urine osm, phos  -presently Show:Clear all  [x]Manual[x]Template[x]Copied    Added by:  [x]Akashbelem Texchelsy Sauceda, APRN - CNP    []Joana for details   Hematology/Oncology  Consult Note        Reason for Consult:  Heterozygous H63d mutation/shock liver  Requesting Physician:  Reese Luu     CHIEF COMPLAINT:         Chief Complaint   Patient presents with    Leg Swelling       bilateral leg swelling     Constipation    Abdominal Pain      History Obtained From:  patient, electronic medical record     HISTORY OF PRESENT ILLNESS:       The patient is a 77 y.o. male with significant past medical history of Alcoholic cirrhosis, tobacco dependence, ETOH, abuse who presents with lower leg swelling, constipation and abdominal pain. Reports jaundice for a few weeks. Looks like he was in the ED 12/26/2020 with dehydration. Looks like MELD score 33, was arranged to have transfer to [facility] for evaluation. He ultimately left AMA. CT abdomen 12/29/2020  Impression   1. Cirrhosis with moderate-severe ascites, not appreciably changed. 2. Diffuse wall thickening in the small and large bowel, as well as along the   gallbladder lumen, likely related to ascites and underlying liver disease. 3. There is intrahepatic ductal dilatation, similar to the previous exam.   4. Hiatal hernia. 5. Anasarca, slightly increased. 6. Colonic diverticulosis. 7. Cholelithiasis. Gallbladder wall thickening is similar to the previous   exam on 12/26/2020 which may be related to underlying liver disease. If   there is concern for acute cholecystitis, a nuclear medicine HIDA scan could   be performed for further evaluation. Venous doppler was negative. Reportedly he stopped drinking 10 days ago. Reports he has been to weak to independently ambulate at home. He is known to Dr. Fermin Wang with last OV 9/25/2020. Iron studies reviewed:  Results for Zehra Shepherd (MRN [id number]) as of 12/30/2020 12:39    Ref.  Range 12/30/2020 05:34 Iron Latest Ref Range: 59 - 158 ug/dL 78   UIBC Latest Ref Range: 110 - 370 ug/dL 14 (LL)   TIBC Latest Ref Range: 250 - 450 ug/dL 92 (L)   Transferrin % Latest Ref Range: 10 - 44 % 85 (H)         Past Medical History:    Past Medical History             Diagnosis Date    Cirrhosis of liver (HCC)      ETOH abuse      Hepatitis C 1977    Skin cancer       nose, resected    Smoker           Past Surgical History:    Past Surgical History   History reviewed. No pertinent surgical history.         Current Medications:      Current Hospital Medications   Current Facility-Administered Medications: cefepime (MAXIPIME) 2 g IVPB minibag, 2 g, Intravenous, Q12H  sodium chloride flush 0.9 % injection 10 mL, 10 mL, Intravenous, 2 times per day  sodium chloride flush 0.9 % injection 10 mL, 10 mL, Intravenous, PRN  LORazepam (ATIVAN) tablet 1 mg, 1 mg, Oral, Q1H PRN **OR** LORazepam (ATIVAN) injection 1 mg, 1 mg, Intravenous, Q1H PRN **OR** LORazepam (ATIVAN) tablet 2 mg, 2 mg, Oral, Q1H PRN **OR** LORazepam (ATIVAN) injection 2 mg, 2 mg, Intravenous, Q1H PRN **OR** LORazepam (ATIVAN) tablet 3 mg, 3 mg, Oral, Q1H PRN **OR** LORazepam (ATIVAN) injection 3 mg, 3 mg, Intravenous, Q1H PRN **OR** LORazepam (ATIVAN) tablet 4 mg, 4 mg, Oral, Q1H PRN **OR** LORazepam (ATIVAN) injection 4 mg, 4 mg, Intravenous, Q1H PRN  demeclocycline (DECLOMYCIN) tablet 300 mg, 300 mg, Oral, 2 times per day  urea (URE-NA) packet 15 g, 15 g, Oral, Daily  pantoprazole (PROTONIX) injection 40 mg, 40 mg, Intravenous, Daily  norepinephrine (LEVOPHED) 16 mg in dextrose 5% 250 mL infusion, 2 mcg/min, Intravenous, Continuous  0.9 % sodium chloride infusion, , Intravenous, Continuous  sodium chloride flush 0.9 % injection 10 mL, 10 mL, Intravenous, 2 times per day  sodium chloride flush 0.9 % injection 10 mL, 10 mL, Intravenous, PRN  promethazine (PHENERGAN) tablet 12.5 mg, 12.5 mg, Oral, Q6H PRN **OR** ondansetron (ZOFRAN) injection 4 mg, 4 mg, Intravenous, Q6H PRN  polyethylene glycol (GLYCOLAX) packet 17 g, 17 g, Oral, Daily PRN  acetaminophen (TYLENOL) tablet 650 mg, 650 mg, Oral, Q6H PRN **OR** acetaminophen (TYLENOL) suppository 650 mg, 650 mg, Rectal, Q6H PRN  thiamine tablet 100 mg, 100 mg, Oral, Daily  multivitamin 1 tablet, 1 tablet, Oral, Daily  folic acid (FOLVITE) tablet 1 mg, 1 mg, Oral, Daily  influenza quadrivalent split vaccine (FLUZONE;FLUARIX;FLULAVAL;AFLURIA) injection 0.5 mL, 0.5 mL, Intramuscular, Prior to discharge        Allergies:  Patient has no known allergies. Social History:    TOBACCO:   reports that he has been smoking cigarettes. He has a 80.00 pack-year smoking history. He has never used smokeless tobacco.  ETOH:   reports current alcohol use of about 8.0 standard drinks of alcohol per week. DRUGS:   reports no history of drug use. Family History:   Family History   History reviewed. No pertinent family history. REVIEW OF SYSTEMS:    +fatigue,      PHYSICAL EXAM:       Vitals:    BP (!) 94/47   Pulse 82   Temp 97.4 °F (36.3 °C) (Oral)   Resp 15   Ht 6' (1.829 m)   Wt 195 lb (88.5 kg)   SpO2 97%   BMI 26.45 kg/m²      CONSTITUTIONAL:  awake, alert, cooperative, ill appearing  EYES:  EOM intact, scleral icterus  LUNGS: diminished, crackles to base  CARDIOVASCULAR:  Regular rate and rhythm  ABDOMEN:  Rounded, soft, mildly tender       NEUROLOGIC:  Awake, alert, oriented to name, place and time. Cranial nerves II-XII are grossly intact. SKIN:  jaundice noted  Extremities: BLE edema +4     IMPRESSION/RECOMMENDATIONS:       Heterozygous H63D mutation: Iron studies reviewed. We will defer phlebotomy until he is stabilized. Liver cirrhosis: We will follow up on recommendations from Dr. [facility] regarding management     Macrocytosis- secondary to alcohol abuse     Thrombocytopenia- secondary to cirrhosis     MARTHA/Hyponatremai- nephrology consulted. Alcohol abuse- last drink 10 days ago.  Agree with RUTH protocol. I saw and examined patient myself. Agreed with above. Thank you for allowing us to participate in the care of this patient. We will continue to follow. Electronically signed by NATE James CNP on 2020 at 8:09 AM               Revision History                          Eliud Bloom MD   Physician   Specialty:  Gastroenterology   Consults   Signed   Date of Service:  2020  1:31 PM               Signed            Show:Clear all  [x]Manual[]Template[]Copied    Added by:  [x]Curt Flynn MD    []Joana for details                    [address], [address] [place] [address] NAME: Paolo Jacob                     :        1955  MED REC NO:   [id number]                          ROOM:       [facility]  ACCOUNT NO:   [de-identified]                           ADMIT DATE: 2020  PROVIDER:     Eliud Bloom MD     CONSULT DATE:  2020     CHIEF COMPLAINT:  1. History of EtOH abuse with alcohol liver disease and portal  hypertension. 2.  The patient presents with abdominal distention and leg swelling. HISTORY OF PRESENT ILLNESS:  The patient is a 56-year-old white  gentleman with longstanding history of EtOH abuse with alcoholic liver  disease and stigmata of portal hypertension, who was being followed up  by Dr. Enio Valadez from  in [facility]. The patient presented to the  emergency room yesterday with increasing abdominal distention and leg  swelling. The patient also complained of generalized abdominal  discomfort. There is no history of nausea, vomiting, hematemesis,  melena, or hematochezia.   In the emergency room, the patient was  hypotensive and he was started on Levophed, and the blood workup done in  the emergency room comprised a chem profile, which showed a sodium of  111 and chloride of 81, BUN was 35, creatinine 1.7, and the liver  profile showed a total bilirubin of 14.8, AST of 623, ALT of 256, and  alkaline phosphatase of 265. The patient's CBC showed a WBC count of  10.5, hemoglobin 12.5, and platelet count of 07,543. The patient's INR  was 3.99. CAT scan of the abdomen and pelvis was done, which showed  cirrhosis with moderate-to-severe ascites and diffuse wall thickening  was noted in the small and large bowel, and also gallstones were noted  along with hiatal hernia. There was intrahepatic ductal dilatation  similar to the prior examination on 12/26/2020. The patient was  admitted for further workup and management. The patient is  hemodynamically stable. The patient did have a large-volume  paracentesis done today and ascitic fluid is negative for SBP. The  patient has not had an EGD or colonoscopy done in the past.     As above mentioned, the patient has seen Dr. Srinath Peralta from GI, who  referred the patient to [place] [id number] in the past also to be evaluated for  liver transplant. According to the patient, his last drink was approximately 10 days ago. The patient has never had an EGD or colonoscopy done in the past.     REVIEW OF SYSTEMS:  CENTRAL NERVOUS SYSTEM:  The patient denies headache or focal  sensorimotor symptoms. CARDIOVASCULAR SYSTEM:  No history of chest pain, but the patient  complains of shortness of breath and leg swelling. GENITOURINARY SYSTEM:  No history of dysuria, pyuria, or hematuria. MUSCULOSKELETAL SYSTEM:  The patient complains of generalized weakness  and yellow discoloration of the skin. RESPIRATORY SYSTEM:  No history of cough, hemoptysis, fever, or chills. PAST MEDICAL HISTORY:  Significant for history of EtOH abuse with  chronic liver disease and stigmata of portal hypertension, i.e.,  ascites. FAMILY HISTORY:  Noncontributory. MEDICATIONS:  Please refer to chart. SOCIOECONOMIC HISTORY:  The patient is a current everyday smoker.   There  is a longstanding history of EtOH abuse, last drink was 11 days ago. There is no history of recreational drug use. PAST SURGICAL HISTORY:  None. ALLERGIES:  No known drug allergies. PHYSICAL EXAMINATION:  GENERAL:  Shows a 64-year-old white gentleman of thin build and poor  nutritional status, who is lying flat in bed, in no acute distress. He  is awake, alert, and oriented, but is slightly drowsy. VITAL SIGNS:  Please refer to the chart. HEENT:  Shows sclerae to be icteric. NECK:  Supple. CHEST:  Shows diminished breath sounds. HEART:  S1 and S2 are normal.  ABDOMEN:  Distended secondary to ascites, nontender. No guarding or  rigidity. Liver and spleen are not palpable. Bowel sounds are present. RECTAL:  Deferred. CNS:  Shows the patient to be awake, alert, and oriented. The patient  is moving all four extremities. MUSCULOSKELETAL SYSTEM:  Shows edema over the lower extremities and  yellow discoloration of the skin. LABORATORY DATA:  The labs drawn during the present hospitalization were  remarkable for sodium of 111, potassium is 4.4, chloride is 81. The  liver profile showed a bilirubin of 14.8, AST of 623, ALT of 256,  alkaline phosphatase of 265. CBC shows a WBC count of 10.5, hemoglobin  12.5, platelet count of 15,999. INR is 3.99. The patient's hepatitis  A, B, and C serology checked on 05/29/2019 showed hepatitis A to be  negative; however, hepatitis B surface antibody was positive and  hepatitis C antibody was negative. Antinuclear antibody, anti-smooth  muscle antibody, and antimitochondrial antibody have been negative. The  patient also had HFE genetic testing done for hemochromatosis and C282Y  gene mutation was negative; however, H63D showed heterozygous mutation.      IMPRESSION:  3  A 64-year-old white gentleman with longstanding history of EtOH  abuse with alcoholic liver disease and stigmata of portal hypertension,  presents with increasing abdominal distention and leg swelling, rule

## 2020-12-31 NOTE — PROGRESS NOTES
[facility] accepted patient and so waiting for transportation etc.,  I left a message for his wife at home number  And later I was told that she had  2 years ago but nephrology has spoken with sister.   Liat Ventura MD  [facility]

## 2020-12-31 NOTE — PROGRESS NOTES
Pt to be transferred to [facility] room 1119. Transport estimated time of arrival is suspected to be around 1124. Report called to receiving RN, Tammy Sheth, from [facility]. Pt updated and agreeable to plan.  Em Mosqueda RN

## 2020-12-31 NOTE — PROGRESS NOTES
Pt gave this RN permission to give daughter, Beulah Lewis, information on pt's condition. This RN notified daughter of pt's transfer to [address] [place] [address] and updated her on pt's condition. All questions and concerns addressed. Daughter stated she is very grateful for the care pt has received.  Margaret Vance RN

## 2020-12-31 NOTE — PROGRESS NOTES
Seen and examined this morning. /61   Pulse 79   Temp 97.7 °F (36.5 °C) (Rectal)   Resp 23   Ht 6' 0.01\" (1.829 m)   Wt 217 lb 9.5 oz (98.7 kg)   SpO2 93%   BMI 29.50 kg/m²   He is drowsy. Regular rate and rhythm. Decreased breath sounds. Abdomen is rounded, tender. Edema BLE. Labs reviewed. Continue to hold phlebotomy. HD today   Palliative care consulted. Agree with transfer to [address] [place] [address].

## 2020-12-31 NOTE — PROGRESS NOTES
12/31/2020    LABGLOM 23 12/31/2020    GLUCOSE 86 12/31/2020    PROT 6.8 12/31/2020    PROT 8.2 06/08/2011    LABALBU 1.9 12/31/2020    CALCIUM 7.3 12/31/2020    BILITOT 20.7 12/31/2020    ALKPHOS 266 12/31/2020     12/31/2020     12/31/2020     No results for input(s): TROPONINT in the last 72 hours. Lab Results   Component Value Date    TSHHS 1.170 12/29/2020         sodium chloride 15 mL/hr (12/30/20 1626)    norepinephrine 30 mcg/min (12/31/20 0537)      cefepime  2 g Intravenous Q12H    sodium chloride flush  10 mL Intravenous 2 times per day    demeclocycline  300 mg Oral 2 times per day    urea  15 g Oral Daily    pantoprazole  40 mg Intravenous Daily    sodium chloride flush  10 mL Intravenous 2 times per day    thiamine  100 mg Oral Daily    multivitamin  1 tablet Oral Daily    folic acid  1 mg Oral Daily    influenza virus vaccine  0.5 mL Intramuscular Prior to discharge         Assessment:       Patient Active Problem List    Diagnosis Date Noted    Hepatic cirrhosis ([facility] [address] 12/29/2020    Neck mass 12/08/2020    Elevated liver enzymes 12/08/2020    Refused Streptococcus pneumoniae vaccination 11/27/2019    Biceps tendinitis on right 85/98/1617    Alcoholic cirrhosis ([facility] [address] 70/98/4956    Tobacco dependence 03/21/2017    ETOH abuse 03/21/2017       Plan:     Problems being addressed this admission:   Sepsis / PNA / AUD / Cirrhosis-portal HTN / ascites / Alejandro Sa potomania   12/30/20-Sodium is 112 today, LFT's still elevated. Low sugars today need to start nutrition. INR is 3.99. Check UDS. GI consult on no notes on Epic as yet. Start CIWA scale. Further recommendations per GI. Prognosis is guarded. Blood cultures pos for Klebsiella pneumonia on Maxipime and vanco will continue. Check AFP as per hematology. Possible PNA as per CXR. On Levophed. Patient in ICU.   12/31/20- his sodium is 111 today and on democlocycline and urea as per nephro. Continues to be on levophed, maxed.  On albumin as well. On Maxipime for his sepsis. Liver functions are worst GI suggests transfer to tertiary care. UDS negative. No obvious bleeding noted. GI wrote prognosis is extremely poor. INR is 5.34 today. May need Vit K if GI agrees. MARTHA / Hyponatremia / Hepato renal Syndrome ? 12/30/20-Bun cre is 39/2.0 today. Nephrology on board. Advised NS and monitor sodium. May need to take fluid off he is 3rd spacing. 12/31/20- on 3% NS but still his sodium is low, bun/cre is 63/2.8 today. Hardly making any urine. Hemachromatosis  12/30/20-Hematology is also seeing patient and he is non compliant to f/u with him. 12/31/20- hematology wrote yesterday ' Heterozygous H63D mutation: Iron studies reviewed. We will defer phlebotomy until he is stabilized. '    PCM  12/30/20-Will start feeding him with a bed side swallow first.  12/31/20- did have some food yesterday earlier, very poor appetite. COVID 19 negative 12/29/20     Consultants:  GI  Hematology  Nephrology    General Orders:  Repeat basic labs again in am.  I have explained to the patient and discussed with him/her the treatment plan. I called [phone] and left a message for his wife that the plan is for him to be transferred to [facility] for care that may not be able to be provided at [facility].    Kai Kraus MD, [address]

## 2020-12-31 NOTE — PROGRESS NOTES
Called sister and updated her. She will dw sibling as they are 3 family he has as spouse and 2 kids passed. Will place temp hd line and try dialysis today and then also consult palliative care as made sister aware how sick he was.

## 2020-12-31 NOTE — PROGRESS NOTES
Dr Hammad Ashby notified about critical lab values and will make adjustments when he come in, Dr Hammad Ashby is going to call his daughter today

## 2020-12-31 NOTE — PROGRESS NOTES
Nephrology Progress Note  12/31/2020 10:11 AM  Subjective:   Admit Date: 12/29/2020    PCP: Collis Gitelman, MD    Interval History: Dr. Marko Gerardo has spoke with sister this morning. Plan of care: place HD temp line and attempt dialysis today   And also consult submitted to palliative care. Anticipate FFP administration per assigned nurse     Diet: Dietary Nutrition Supplements: Standard High Calorie Oral Supplement  DIET GENERAL; Low Sodium (2 GM); Daily Fluid Restriction: 1500 ml    Data:   Scheduled Meds:   cefepime  2 g Intravenous Q12H    sodium chloride flush  10 mL Intravenous 2 times per day    demeclocycline  300 mg Oral 2 times per day    urea  15 g Oral Daily    pantoprazole  40 mg Intravenous Daily    sodium chloride flush  10 mL Intravenous 2 times per day    thiamine  100 mg Oral Daily    multivitamin  1 tablet Oral Daily    folic acid  1 mg Oral Daily    influenza virus vaccine  0.5 mL Intramuscular Prior to discharge     Continuous Infusions:   sodium chloride      sodium chloride 15 mL/hr (12/30/20 1626)    norepinephrine 30 mcg/min (12/31/20 0537)     PRN Meds:sodium chloride, sodium chloride flush, LORazepam **OR** LORazepam **OR** LORazepam **OR** LORazepam **OR** LORazepam **OR** LORazepam **OR** LORazepam **OR** LORazepam, sodium chloride flush, promethazine **OR** ondansetron, polyethylene glycol, acetaminophen **OR** acetaminophen  I/O last 3 completed shifts:   In: 847 [P.O.:640; I.V.:127; IV Piggyback:80]  Out: 125 [Urine:125]  I/O this shift:  In: 34 [IV Piggyback:34]  Out: -     Intake/Output Summary (Last 24 hours) at 12/31/2020 1011  Last data filed at 12/31/2020 0926  Gross per 24 hour   Intake 881 ml   Output 125 ml   Net 756 ml     CBC:   Recent Labs     12/29/20  1242 12/31/20  0530   WBC 10.5 10.6*   HGB 12.5* 11.8*   PLT 98* 97*     BMP:    Recent Labs     12/30/20  1300 12/30/20  1800 12/30/20  1800 12/31/20  0344 12/31/20  0530 12/31/20  0800   * 116*   < Lab Results   Component Value Date    FERRITIN 1,012 09/25/2020     RPR:  No results found for: RPR  FERNIE:    Lab Results   Component Value Date    FERNIE None Detected 05/29/2019    FERNIE  05/29/2019     (NOTE)  If suspicion of connective tissue disease is strong and FERNIE EIA is   negative, consider testing for FERNIE by IFA ([id number]). INTERPRETIVE INFORMATION: Anti-Nuclear Antibodies (FERNIE), IgG by   ALEKSANDER  Anti-Nuclear Antibodies (FERNIE), IgG by ALEKSANDER: FERNIE specimens are   screened using enzyme-linked immunosorbent assay (ALEKSANDER)   methodology. All ALEKSANDER results reported as Detected are further   tested by indirect fluorescent assay (IFA) using HEp-2 substrate   with an IgG-specific conjugate. The FERNIE ALEKSANDER screen is designed   to detect antibodies against dsDNA, histone, SS-A (Ro), SS-B (La),   Figueroa, snRNP/Sm, Scl-70, Dhara-1, centromere, and an extract of lysed   HEp-2 cells. FERNIE ALEKSANDER assays have been reported to have lower   sensitivities than FERNIE IFA for systemic autoimmune rheumatic   diseases (SARD). Negative results do not necessarily rule out SARD. Performed by [place] [id number], [id number] [facility] [phone]  www. Noel Casanova MD, Lab. Director       Objective:   Patient Active Problem List:     Alcoholic cirrhosis ([address]     Tobacco dependence     ETOH abuse     Biceps tendinitis on right     Refused Streptococcus pneumoniae vaccination     Neck mass     Elevated liver enzymes     Hepatic cirrhosis (HCC)    BP (!) 90/56   Pulse 75   Temp 97.7 °F (36.5 °C) (Rectal)   Resp 18   Ht 6' 0.01\" (1.829 m)   Wt 217 lb 9.5 oz (98.7 kg)   SpO2 96%   BMI 29.50 kg/m²      General appearance:  awake and verbally interactive   HEENT: Head: Normal, normocephalic, atraumatic.   Neck: supple, symmetrical, trachea midline  Cardiovascular: S1 and S2: normal / no rub  Pulmonary: diminished lung sounds bilaterally  Abdomen:  soft / non-tender / distended   Extremities: ++ edema to bilateral lower legs and thighs    Impression and Plan:    Impression   1. Acute on chronic intermittent hyponatremia   -consider potomania as well as hypovolemic hyponatremia      2. MARTHA (ATN vs. Pre-renal azotemia)  -likely multi-factorial etiology for MARTHA including:   Consider decompensated cirrhosis as well as   Intravascular volume depletion. Cannot exclude infection      3. Possible sepsis   4. Hypotension   5. Liver Cirrhosis with jaundice  6. Dysphagia      PLAN   1.   -persistently low serum sodium with low uop  -uop: 125 ml in the last 24 hours   -anticipate temp HD placement and attempt at HD today   2.   -minimal urine output with higher trending creatinine   -anticipate temp HD placement and attempt at HD today   3.   -presently on vanco and cefepime   4   -BP trend: systolic BP's have recently been 85 - 106  -on levophed drip  5.   -has been followed by GI and hematology   -recommend transfer to tertiary care center for further management   -anticipate FFP today   6.   -recommend referral for swallow study; has been pocketing food    Electronically signed by NATE Villalta - CNP                       Nephrology Attending Progress Note  12/31/2020 10:41 AM  Subjective: Interval History: I have personally performed face to face diagnostic evaluation on this patient. I have personally reviewed pertinent labs and imaging and agree with the care plan above. My additional findings are as follows:   The patient is a 77 y.o. male who presents with weakness and arousable and weants to have care and dialysis    Objective:   Vitals: BP (!) 90/56   Pulse 75   Temp 97.7 °F (36.5 °C) (Rectal)   Resp 18   Ht 6' 0.01\" (1.829 m)   Wt 217 lb 9.5 oz (98.7 kg)   SpO2 96%   BMI 29.50 kg/m²   Weak jaundice  Edema  ascite    Assessment and Plan:  1 plan dialysis today and to [facility] and they can do there  2 with liver failure and increase inr to [facility] today at 1130am  3 place temp hd catheter  4 bp low and pressor as need  5 fu gi rec  Will monitor and agree in setting cirrhosis with hepatorrenal need higher level care and rec dialysis and also gi ana [facility]  dw sister as well       Electronically signed by Fariba Chavez MD on 12/31/2020 at 10:41 AM

## 2020-12-31 NOTE — PLAN OF CARE
Problem: Skin Integrity:  Goal: Will show no infection signs and symptoms  Description: Will show no infection signs and symptoms  12/31/2020 1323 by Marda Riding  Outcome: Completed  12/31/2020 1322 by Marda Riding  Reactivated  12/31/2020 1322 by Marda Riding  Outcome: Completed  Goal: Absence of new skin breakdown  Description: Absence of new skin breakdown  12/31/2020 1323 by Marda Riding  Outcome: Completed  12/31/2020 1322 by Marda Riding  Reactivated  12/31/2020 1322 by Marda Riding  Outcome: Completed     Problem: Falls - Risk of:  Goal: Will remain free from falls  Description: Will remain free from falls  12/31/2020 1323 by Marda Riding  Outcome: Completed  12/31/2020 1322 by Marda Riding  Reactivated  12/31/2020 1322 by Marda Riding  Outcome: Completed  Goal: Absence of physical injury  Description: Absence of physical injury  12/31/2020 1323 by Marda Riding  Outcome: Completed  12/31/2020 1322 by Marda Riding  Reactivated  12/31/2020 1322 by Marda Riding  Outcome: Completed     Problem: Infection:  Goal: Will remain free from infection  Description: Will remain free from infection  12/31/2020 1323 by Marda Riding  Outcome: Completed  12/31/2020 1322 by Marda Riding  Reactivated  12/31/2020 1322 by Marda Riding  Outcome: Completed     Problem: Safety:  Goal: Free from accidental physical injury  Description: Free from accidental physical injury  12/31/2020 1323 by Marda Riding  Outcome: Completed  12/31/2020 1322 by Marda Riding  Reactivated  12/31/2020 1322 by Marda Riding  Outcome: Completed  Goal: Free from intentional harm  Description: Free from intentional harm  12/31/2020 1323 by Marda Riding  Outcome: Completed  12/31/2020 1322 by Marda Riding  Reactivated  12/31/2020 1322 by Marda Riding  Outcome: Completed     Problem: Daily Care:  Goal: Daily care needs are met  Description: Daily care needs are met  12/31/2020 1323 by Marda Riding  Outcome: Completed  12/31/2020 1322 by [facility] Cece  Reactivated  12/31/2020 1322 by Naima Gary  Outcome: Completed     Problem: Pain:  Goal: Patient's pain/discomfort is manageable  Description: Patient's pain/discomfort is manageable  12/31/2020 1323 by Naima Gary  Outcome: Completed  12/31/2020 1322 by Naima Gary  Reactivated  12/31/2020 1322 by Naima Gary  Outcome: Completed     Problem: Discharge Planning:  Goal: Patients continuum of care needs are met  Description: Patients continuum of care needs are met  12/31/2020 1323 by Naima Gary  Outcome: Completed  12/31/2020 1322 by Naima Gary  Reactivated  12/31/2020 1322 by Naima Gary  Outcome: Completed

## 2020-12-31 NOTE — PROGRESS NOTES
PT DOING POORLY DROWSY NO GROSS GIT BLEEDING MRCP COULD NOT BE DONE BECAUSE SCANNER  NOT RUNNING  VITALS STABLE  LABS NOTED WORSENING LFTS AND RENAL FUNCTIONS R/O HRS  PT BEING TRANSFERRED TO [facility] FOR CLOSER MONITORING

## 2020-12-31 NOTE — PROGRESS NOTES
Speech Language Pathology  Radha Sadler  1955  [id number]      Noted bedside swallowing evaluation ordered and chart reviewed 12/31/20. Pt to be transferred to [facility] later today. Will defer swallowing evaluation at this time.     Demetria Heck 85, CCC-SLP, 12/31/2020

## 2021-01-02 LAB — HEPATITIS B CORE TOTAL ANTIBODY: POSITIVE

## 2021-01-03 LAB
CULTURE: NORMAL
Lab: NORMAL
SPECIMEN: NORMAL

## 2021-01-06 LAB
EKG ATRIAL RATE: 78 BPM
EKG ATRIAL RATE: 82 BPM
EKG DIAGNOSIS: NORMAL
EKG DIAGNOSIS: NORMAL
EKG P AXIS: 31 DEGREES
EKG P AXIS: 43 DEGREES
EKG P-R INTERVAL: 178 MS
EKG P-R INTERVAL: 180 MS
EKG Q-T INTERVAL: 398 MS
EKG Q-T INTERVAL: 404 MS
EKG QRS DURATION: 104 MS
EKG QRS DURATION: 106 MS
EKG QTC CALCULATION (BAZETT): 460 MS
EKG QTC CALCULATION (BAZETT): 464 MS
EKG R AXIS: 4 DEGREES
EKG R AXIS: 7 DEGREES
EKG T AXIS: -3 DEGREES
EKG T AXIS: 33 DEGREES
EKG VENTRICULAR RATE: 78 BPM
EKG VENTRICULAR RATE: 82 BPM

## 2021-01-07 LAB
CULTURE: ABNORMAL
CULTURE: ABNORMAL
Lab: ABNORMAL
SPECIMEN: ABNORMAL

## 2021-02-11 ENCOUNTER — TELEPHONE (OUTPATIENT)
Dept: FAMILY MEDICINE CLINIC | Age: 66
End: 2021-02-11